# Patient Record
Sex: FEMALE | Race: WHITE | Employment: FULL TIME | URBAN - METROPOLITAN AREA
[De-identification: names, ages, dates, MRNs, and addresses within clinical notes are randomized per-mention and may not be internally consistent; named-entity substitution may affect disease eponyms.]

---

## 2021-10-15 ENCOUNTER — APPOINTMENT (OUTPATIENT)
Dept: CT IMAGING | Age: 21
DRG: 494 | End: 2021-10-15
Payer: COMMERCIAL

## 2021-10-15 ENCOUNTER — APPOINTMENT (OUTPATIENT)
Dept: GENERAL RADIOLOGY | Age: 21
DRG: 494 | End: 2021-10-15
Payer: COMMERCIAL

## 2021-10-15 ENCOUNTER — HOSPITAL ENCOUNTER (INPATIENT)
Age: 21
LOS: 2 days | Discharge: HOME | DRG: 494 | End: 2021-10-17
Attending: EMERGENCY MEDICINE | Admitting: SURGERY
Payer: COMMERCIAL

## 2021-10-15 DIAGNOSIS — S82.301A CLOSED FRACTURE OF DISTAL END OF RIGHT TIBIA, UNSPECIFIED FRACTURE MORPHOLOGY, INITIAL ENCOUNTER: Primary | ICD-10-CM

## 2021-10-15 DIAGNOSIS — T14.90XA TRAUMA: ICD-10-CM

## 2021-10-15 LAB
ABO/RH: NORMAL
ALLEN TEST: ABNORMAL
ANION GAP SERPL CALCULATED.3IONS-SCNC: 11 MMOL/L (ref 9–17)
ANTIBODY SCREEN: NEGATIVE
ARM BAND NUMBER: NORMAL
BLOOD BANK SPECIMEN: ABNORMAL
BUN BLDV-MCNC: 10 MG/DL (ref 6–20)
CARBOXYHEMOGLOBIN: 0.4 % (ref 0–5)
CHLORIDE BLD-SCNC: 109 MMOL/L (ref 98–107)
CO2: 19 MMOL/L (ref 20–31)
CREAT SERPL-MCNC: 0.5 MG/DL (ref 0.5–0.9)
ETHANOL PERCENT: <0.01 %
ETHANOL: <10 MG/DL
EXPIRATION DATE: NORMAL
FIO2: ABNORMAL
GFR AFRICAN AMERICAN: ABNORMAL ML/MIN
GFR NON-AFRICAN AMERICAN: ABNORMAL ML/MIN
GFR SERPL CREATININE-BSD FRML MDRD: ABNORMAL ML/MIN/{1.73_M2}
GFR SERPL CREATININE-BSD FRML MDRD: ABNORMAL ML/MIN/{1.73_M2}
GLUCOSE BLD-MCNC: 115 MG/DL (ref 70–99)
HCG QUALITATIVE: NEGATIVE
HCO3 VENOUS: 22.9 MMOL/L (ref 24–30)
HCT VFR BLD CALC: 36.5 % (ref 36.3–47.1)
HEMOGLOBIN: 11.7 G/DL (ref 11.9–15.1)
INR BLD: 1.1
MCH RBC QN AUTO: 31.8 PG (ref 25.2–33.5)
MCHC RBC AUTO-ENTMCNC: 32.1 G/DL (ref 28.4–34.8)
MCV RBC AUTO: 99.2 FL (ref 82.6–102.9)
METHEMOGLOBIN: ABNORMAL % (ref 0–1.5)
MODE: ABNORMAL
NEGATIVE BASE EXCESS, VEN: 3.3 MMOL/L (ref 0–2)
NOTIFICATION TIME: ABNORMAL
NOTIFICATION: ABNORMAL
NRBC AUTOMATED: 0 PER 100 WBC
O2 DEVICE/FLOW/%: ABNORMAL
O2 SAT, VEN: 61 % (ref 60–85)
OXYHEMOGLOBIN: ABNORMAL % (ref 95–98)
PARTIAL THROMBOPLASTIN TIME: 25.3 SEC (ref 20.5–30.5)
PATIENT TEMP: 37
PCO2, VEN, TEMP ADJ: ABNORMAL MMHG (ref 39–55)
PCO2, VEN: 48.5 (ref 39–55)
PDW BLD-RTO: 11.8 % (ref 11.8–14.4)
PEEP/CPAP: ABNORMAL
PH VENOUS: 7.29 (ref 7.32–7.42)
PH, VEN, TEMP ADJ: ABNORMAL (ref 7.32–7.42)
PLATELET # BLD: 203 K/UL (ref 138–453)
PMV BLD AUTO: 10 FL (ref 8.1–13.5)
PO2, VEN, TEMP ADJ: ABNORMAL MMHG (ref 30–50)
PO2, VEN: 37.3 (ref 30–50)
POSITIVE BASE EXCESS, VEN: ABNORMAL MMOL/L (ref 0–2)
POTASSIUM SERPL-SCNC: 3.9 MMOL/L (ref 3.7–5.3)
PROTHROMBIN TIME: 12.1 SEC (ref 9.1–12.3)
PSV: ABNORMAL
PT. POSITION: ABNORMAL
RBC # BLD: 3.68 M/UL (ref 3.95–5.11)
RESPIRATORY RATE: ABNORMAL
SAMPLE SITE: ABNORMAL
SARS-COV-2, RAPID: NOT DETECTED
SET RATE: ABNORMAL
SODIUM BLD-SCNC: 139 MMOL/L (ref 135–144)
SPECIMEN DESCRIPTION: NORMAL
TEXT FOR RESPIRATORY: ABNORMAL
TOTAL HB: ABNORMAL G/DL (ref 12–16)
TOTAL RATE: ABNORMAL
VITAMIN D 25-HYDROXY: 29.3 NG/ML (ref 30–100)
VT: ABNORMAL
WBC # BLD: 15.2 K/UL (ref 4.5–13.5)

## 2021-10-15 PROCEDURE — 99284 EMERGENCY DEPT VISIT MOD MDM: CPT

## 2021-10-15 PROCEDURE — 96374 THER/PROPH/DIAG INJ IV PUSH: CPT

## 2021-10-15 PROCEDURE — 6360000002 HC RX W HCPCS: Performed by: STUDENT IN AN ORGANIZED HEALTH CARE EDUCATION/TRAINING PROGRAM

## 2021-10-15 PROCEDURE — 82805 BLOOD GASES W/O2 SATURATION: CPT

## 2021-10-15 PROCEDURE — 82306 VITAMIN D 25 HYDROXY: CPT

## 2021-10-15 PROCEDURE — 84703 CHORIONIC GONADOTROPIN ASSAY: CPT

## 2021-10-15 PROCEDURE — 73700 CT LOWER EXTREMITY W/O DYE: CPT

## 2021-10-15 PROCEDURE — 73600 X-RAY EXAM OF ANKLE: CPT

## 2021-10-15 PROCEDURE — 85610 PROTHROMBIN TIME: CPT

## 2021-10-15 PROCEDURE — 73620 X-RAY EXAM OF FOOT: CPT

## 2021-10-15 PROCEDURE — G0480 DRUG TEST DEF 1-7 CLASSES: HCPCS

## 2021-10-15 PROCEDURE — 6370000000 HC RX 637 (ALT 250 FOR IP): Performed by: STUDENT IN AN ORGANIZED HEALTH CARE EDUCATION/TRAINING PROGRAM

## 2021-10-15 PROCEDURE — 73590 X-RAY EXAM OF LOWER LEG: CPT

## 2021-10-15 PROCEDURE — 85730 THROMBOPLASTIN TIME PARTIAL: CPT

## 2021-10-15 PROCEDURE — 72170 X-RAY EXAM OF PELVIS: CPT

## 2021-10-15 PROCEDURE — 73562 X-RAY EXAM OF KNEE 3: CPT

## 2021-10-15 PROCEDURE — 85027 COMPLETE CBC AUTOMATED: CPT

## 2021-10-15 PROCEDURE — 80051 ELECTROLYTE PANEL: CPT

## 2021-10-15 PROCEDURE — 82565 ASSAY OF CREATININE: CPT

## 2021-10-15 PROCEDURE — 86900 BLOOD TYPING SEROLOGIC ABO: CPT

## 2021-10-15 PROCEDURE — 71045 X-RAY EXAM CHEST 1 VIEW: CPT

## 2021-10-15 PROCEDURE — 82947 ASSAY GLUCOSE BLOOD QUANT: CPT

## 2021-10-15 PROCEDURE — 87635 SARS-COV-2 COVID-19 AMP PRB: CPT

## 2021-10-15 PROCEDURE — 86850 RBC ANTIBODY SCREEN: CPT

## 2021-10-15 PROCEDURE — 6810039001 HC L1 TRAUMA PRIORITY

## 2021-10-15 PROCEDURE — 1200000000 HC SEMI PRIVATE

## 2021-10-15 PROCEDURE — 2700000000 HC OXYGEN THERAPY PER DAY

## 2021-10-15 PROCEDURE — 73552 X-RAY EXAM OF FEMUR 2/>: CPT

## 2021-10-15 PROCEDURE — 86901 BLOOD TYPING SEROLOGIC RH(D): CPT

## 2021-10-15 PROCEDURE — 84520 ASSAY OF UREA NITROGEN: CPT

## 2021-10-15 PROCEDURE — 2580000003 HC RX 258: Performed by: STUDENT IN AN ORGANIZED HEALTH CARE EDUCATION/TRAINING PROGRAM

## 2021-10-15 RX ORDER — ONDANSETRON 2 MG/ML
4 INJECTION INTRAMUSCULAR; INTRAVENOUS EVERY 6 HOURS PRN
Status: DISCONTINUED | OUTPATIENT
Start: 2021-10-15 | End: 2021-10-17 | Stop reason: HOSPADM

## 2021-10-15 RX ORDER — ONDANSETRON 2 MG/ML
INJECTION INTRAMUSCULAR; INTRAVENOUS
Status: DISPENSED
Start: 2021-10-15 | End: 2021-10-16

## 2021-10-15 RX ORDER — PROPOFOL 10 MG/ML
INJECTION, EMULSION INTRAVENOUS
Status: DISPENSED
Start: 2021-10-15 | End: 2021-10-16

## 2021-10-15 RX ORDER — SODIUM CHLORIDE 9 MG/ML
25 INJECTION, SOLUTION INTRAVENOUS PRN
Status: DISCONTINUED | OUTPATIENT
Start: 2021-10-15 | End: 2021-10-17 | Stop reason: HOSPADM

## 2021-10-15 RX ORDER — POLYETHYLENE GLYCOL 3350 17 G/17G
17 POWDER, FOR SOLUTION ORAL DAILY
Status: DISCONTINUED | OUTPATIENT
Start: 2021-10-16 | End: 2021-10-17 | Stop reason: HOSPADM

## 2021-10-15 RX ORDER — OXYCODONE HYDROCHLORIDE 5 MG/1
5 TABLET ORAL EVERY 4 HOURS PRN
Status: DISCONTINUED | OUTPATIENT
Start: 2021-10-15 | End: 2021-10-17

## 2021-10-15 RX ORDER — PROPOFOL 10 MG/ML
5-50 INJECTION, EMULSION INTRAVENOUS
Status: DISCONTINUED | OUTPATIENT
Start: 2021-10-15 | End: 2021-10-16

## 2021-10-15 RX ORDER — METHOCARBAMOL 500 MG/1
750 TABLET, FILM COATED ORAL 4 TIMES DAILY
Status: DISCONTINUED | OUTPATIENT
Start: 2021-10-15 | End: 2021-10-17 | Stop reason: HOSPADM

## 2021-10-15 RX ORDER — SODIUM CHLORIDE 0.9 % (FLUSH) 0.9 %
5-40 SYRINGE (ML) INJECTION PRN
Status: DISCONTINUED | OUTPATIENT
Start: 2021-10-15 | End: 2021-10-17 | Stop reason: HOSPADM

## 2021-10-15 RX ORDER — FENTANYL CITRATE 50 UG/ML
50 INJECTION, SOLUTION INTRAMUSCULAR; INTRAVENOUS
Status: DISCONTINUED | OUTPATIENT
Start: 2021-10-15 | End: 2021-10-17 | Stop reason: HOSPADM

## 2021-10-15 RX ORDER — GABAPENTIN 300 MG/1
300 CAPSULE ORAL 3 TIMES DAILY
Status: DISCONTINUED | OUTPATIENT
Start: 2021-10-15 | End: 2021-10-17 | Stop reason: HOSPADM

## 2021-10-15 RX ORDER — ACETAMINOPHEN 500 MG
1000 TABLET ORAL EVERY 8 HOURS SCHEDULED
Status: DISCONTINUED | OUTPATIENT
Start: 2021-10-15 | End: 2021-10-17 | Stop reason: HOSPADM

## 2021-10-15 RX ORDER — FENTANYL CITRATE 50 UG/ML
50 INJECTION, SOLUTION INTRAMUSCULAR; INTRAVENOUS ONCE
Status: COMPLETED | OUTPATIENT
Start: 2021-10-15 | End: 2021-10-15

## 2021-10-15 RX ORDER — PROPOFOL 10 MG/ML
1000 INJECTION, EMULSION INTRAVENOUS
Status: DISCONTINUED | OUTPATIENT
Start: 2021-10-15 | End: 2021-10-15

## 2021-10-15 RX ORDER — ONDANSETRON 2 MG/ML
4 INJECTION INTRAMUSCULAR; INTRAVENOUS ONCE
Status: COMPLETED | OUTPATIENT
Start: 2021-10-15 | End: 2021-10-15

## 2021-10-15 RX ORDER — BISACODYL 10 MG
10 SUPPOSITORY, RECTAL RECTAL DAILY PRN
Status: DISCONTINUED | OUTPATIENT
Start: 2021-10-15 | End: 2021-10-17 | Stop reason: HOSPADM

## 2021-10-15 RX ORDER — SODIUM CHLORIDE 0.9 % (FLUSH) 0.9 %
5-40 SYRINGE (ML) INJECTION EVERY 12 HOURS SCHEDULED
Status: DISCONTINUED | OUTPATIENT
Start: 2021-10-15 | End: 2021-10-17 | Stop reason: HOSPADM

## 2021-10-15 RX ORDER — SODIUM CHLORIDE, SODIUM LACTATE, POTASSIUM CHLORIDE, CALCIUM CHLORIDE 600; 310; 30; 20 MG/100ML; MG/100ML; MG/100ML; MG/100ML
INJECTION, SOLUTION INTRAVENOUS CONTINUOUS
Status: DISCONTINUED | OUTPATIENT
Start: 2021-10-15 | End: 2021-10-16

## 2021-10-15 RX ORDER — PROPOFOL 10 MG/ML
INJECTION, EMULSION INTRAVENOUS DAILY PRN
Status: COMPLETED | OUTPATIENT
Start: 2021-10-15 | End: 2021-10-15

## 2021-10-15 RX ADMIN — ONDANSETRON 4 MG: 2 INJECTION INTRAMUSCULAR; INTRAVENOUS at 23:31

## 2021-10-15 RX ADMIN — PROPOFOL 10 MG: 10 INJECTION, EMULSION INTRAVENOUS at 21:19

## 2021-10-15 RX ADMIN — SODIUM CHLORIDE, PRESERVATIVE FREE 10 ML: 5 INJECTION INTRAVENOUS at 23:33

## 2021-10-15 RX ADMIN — ACETAMINOPHEN 1000 MG: 500 TABLET ORAL at 23:37

## 2021-10-15 RX ADMIN — PROPOFOL 10 MG: 10 INJECTION, EMULSION INTRAVENOUS at 21:23

## 2021-10-15 RX ADMIN — PROPOFOL 10 MG: 10 INJECTION, EMULSION INTRAVENOUS at 21:17

## 2021-10-15 RX ADMIN — PROPOFOL 10 MG: 10 INJECTION, EMULSION INTRAVENOUS at 21:24

## 2021-10-15 RX ADMIN — PROPOFOL 10 MG: 10 INJECTION, EMULSION INTRAVENOUS at 21:25

## 2021-10-15 RX ADMIN — PROPOFOL 10 MG: 10 INJECTION, EMULSION INTRAVENOUS at 21:20

## 2021-10-15 RX ADMIN — METHOCARBAMOL TABLETS 750 MG: 500 TABLET, COATED ORAL at 23:37

## 2021-10-15 RX ADMIN — FENTANYL CITRATE 50 MCG: 50 INJECTION INTRAMUSCULAR; INTRAVENOUS at 22:34

## 2021-10-15 RX ADMIN — PROPOFOL 10 MG: 10 INJECTION, EMULSION INTRAVENOUS at 21:18

## 2021-10-15 RX ADMIN — SODIUM CHLORIDE, POTASSIUM CHLORIDE, SODIUM LACTATE AND CALCIUM CHLORIDE: 600; 310; 30; 20 INJECTION, SOLUTION INTRAVENOUS at 23:44

## 2021-10-15 RX ADMIN — GABAPENTIN 300 MG: 300 CAPSULE ORAL at 23:37

## 2021-10-15 RX ADMIN — PROPOFOL 20 MG: 10 INJECTION, EMULSION INTRAVENOUS at 21:21

## 2021-10-15 RX ADMIN — PROPOFOL 10 MG: 10 INJECTION, EMULSION INTRAVENOUS at 21:22

## 2021-10-15 ASSESSMENT — ENCOUNTER SYMPTOMS
CHEST TIGHTNESS: 0
WHEEZING: 0
ABDOMINAL PAIN: 0
BACK PAIN: 0
VOMITING: 0
SHORTNESS OF BREATH: 0
NAUSEA: 0
CONSTIPATION: 0
DIARRHEA: 0
COLOR CHANGE: 0
COUGH: 0

## 2021-10-15 ASSESSMENT — PAIN SCALES - GENERAL
PAINLEVEL_OUTOF10: 10
PAINLEVEL_OUTOF10: 8

## 2021-10-15 NOTE — ED PROVIDER NOTES
Food in the Last Year:    Transportation Needs:     Lack of Transportation (Medical):  Lack of Transportation (Non-Medical):    Physical Activity:     Days of Exercise per Week:     Minutes of Exercise per Session:    Stress:     Feeling of Stress :    Social Connections:     Frequency of Communication with Friends and Family:     Frequency of Social Gatherings with Friends and Family:     Attends Jewish Services:     Active Member of Clubs or Organizations:     Attends Club or Organization Meetings:     Marital Status:    Intimate Partner Violence:     Fear of Current or Ex-Partner:     Emotionally Abused:     Physically Abused:     Sexually Abused:        No family history on file. Allergies:  Patient has no allergy information on record. Home Medications:  Prior to Admission medications    Not on File       REVIEW OF SYSTEMS    (2-9 systems for level 4, 10 or more for level 5)      Review of Systems   Constitutional: Negative for chills, fatigue and fever. Eyes: Negative for visual disturbance. Respiratory: Negative for cough, chest tightness, shortness of breath and wheezing. Cardiovascular: Negative for chest pain, palpitations and leg swelling. Gastrointestinal: Negative for abdominal pain, constipation, diarrhea, nausea and vomiting. Genitourinary: Negative for difficulty urinating, dysuria and urgency. Musculoskeletal: Positive for arthralgias (Right ankle). Negative for back pain, neck pain and neck stiffness. Skin: Negative for color change, pallor and rash. Neurological: Negative for dizziness, weakness, light-headedness and headaches. PHYSICAL EXAM   (up to 7 for level 4, 8 or more for level 5)      INITIAL VITALS:   BP 95/72   Pulse 104   Resp 15   Ht 5' 2\" (1.575 m)   Wt 95 lb (43.1 kg)   SpO2 100%   BMI 17.38 kg/m²     Physical Exam  Vitals and nursing note reviewed. Constitutional:       General: She is not in acute distress.      Appearance: She is well-developed. She is not diaphoretic. HENT:      Head: Normocephalic and atraumatic. Eyes:      General: No scleral icterus. Conjunctiva/sclera: Conjunctivae normal.      Pupils: Pupils are equal, round, and reactive to light. Neck:      Vascular: No JVD. Trachea: No tracheal deviation. Cardiovascular:      Rate and Rhythm: Normal rate and regular rhythm. Heart sounds: Normal heart sounds. No murmur heard. No friction rub. Pulmonary:      Effort: Pulmonary effort is normal. No respiratory distress. Breath sounds: Normal breath sounds. No wheezing. Chest:      Chest wall: No tenderness. Abdominal:      General: Bowel sounds are normal. There is no distension. Palpations: Abdomen is soft. Tenderness: There is no abdominal tenderness. There is no guarding. Musculoskeletal:         General: Tenderness (Right ankle) present. Cervical back: Normal range of motion and neck supple. Left lower leg: Edema (in the area of fracture) present. Skin:     General: Skin is warm and dry. Capillary Refill: Capillary refill takes less than 2 seconds. Coloration: Skin is not pale. Findings: No erythema. Neurological:      Mental Status: She is alert and oriented to person, place, and time. Cranial Nerves: No cranial nerve deficit. Sensory: No sensory deficit.    Psychiatric:         Behavior: Behavior normal.         DIFFERENTIAL  DIAGNOSIS     PLAN (LABS / IMAGING / EKG):  Orders Placed This Encounter   Procedures    COVID-19, Rapid    COVID-19, Rapid    XR CHEST PORTABLE    XR PELVIS (1-2 VIEWS)    XR FEMUR RIGHT (MIN 2 VIEWS)    XR KNEE RIGHT (3 VIEWS)    XR TIBIA FIBULA RIGHT (2 VIEWS)    XR ANKLE RIGHT (2 VIEWS)    XR FOOT RIGHT (2 VIEWS)    XR TIBIA FIBULA RIGHT (2 VIEWS)    XR TIBIA FIBULA RIGHT (2 VIEWS)    XR TIBIA FIBULA RIGHT (2 VIEWS)    CT TIBIA FIBULA RIGHT WO CONTRAST    CT FOOT RIGHT WO CONTRAST    Trauma Panel  Vitamin D 25 Hydroxy    Diet NPO    Non weight bearing    Inpatient consult to Orthopedic Surgery    End Tidal CO2 Continuous    Nasal Cannula Oxygen    Type and Screen    PATIENT STATUS (FROM ED OR OR/PROCEDURAL) Inpatient       MEDICATIONS ORDERED:  Orders Placed This Encounter   Medications    DISCONTD: iopamidol (ISOVUE-370) 76 % injection 130 mL    ondansetron (ZOFRAN) 4 MG/2ML injection     Ezio Barba: cabinet override    DISCONTD: propofol injection 1,000 mcg    propofol 200 MG/20ML injection     EMMANUEL PASCUAL: cabinet override    propofol injection    ondansetron (ZOFRAN) injection 4 mg    ondansetron (ZOFRAN) 4 MG/2ML injection     EMMANUEL PASCUAL: cabinet override    propofol injection    ceFAZolin (ANCEF) 2000 mg in dextrose 5 % 50 mL IVPB     Order Specific Question:   Antimicrobial Indications     Answer:   Surgical Prophylaxis    fentaNYL (SUBLIMAZE) injection 50 mcg       DDX: Ankle fracture, deformity    MDM/IMPRESSION: This is a 45-year-old female sent from outside hospital for concern for unable to obtain pulses with right tib-fib fracture with obvious deformity. No open wound on that ankle. There is a small 1.5 cm laceration on the medial malleolus of the left ankle. Trauma team met patient in the trauma bay upon arrival to trauma priority given concern for lack of pulse, however pulse was able to be obtained. With Doppler that was palpable once it was located. Anticipate Ortho consult and probable trauma admission for OR with ortho tomorrow. DIAGNOSTIC RESULTS / EMERGENCY DEPARTMENT COURSE / MDM   LAB RESULTS:  Results for orders placed or performed during the hospital encounter of 10/15/21   COVID-19, Rapid    Specimen: Nasopharyngeal Swab   Result Value Ref Range    Specimen Description . NASOPHARYNGEAL SWAB     SARS-CoV-2, Rapid Not Detected Not Detected   Trauma Panel   Result Value Ref Range    Ethanol <10 <10 mg/dL    Ethanol percent <0.010 <0.010 %    Blood Bank Specimen BILL FOR SERVICES PERFORMED     BUN 10 6 - 20 mg/dL    WBC 15.2 (H) 4.5 - 13.5 k/uL    RBC 3.68 (L) 3.95 - 5.11 m/uL    Hemoglobin 11.7 (L) 11.9 - 15.1 g/dL    Hematocrit 36.5 36.3 - 47.1 %    MCV 99.2 82.6 - 102.9 fL    MCH 31.8 25.2 - 33.5 pg    MCHC 32.1 28.4 - 34.8 g/dL    RDW 11.8 11.8 - 14.4 %    Platelets 376 663 - 989 k/uL    MPV 10.0 8.1 - 13.5 fL    NRBC Automated 0.0 0.0 per 100 WBC    CREATININE 0.50 0.50 - 0.90 mg/dL    GFR Non- NOT REPORTED >60 mL/min    GFR  NOT REPORTED >60 mL/min    GFR Comment NOT REPORTED     GFR Staging NOT REPORTED     Glucose 115 (H) 70 - 99 mg/dL    hCG Qual NEGATIVE NEGATIVE    Sodium 139 135 - 144 mmol/L    Potassium 3.9 3.7 - 5.3 mmol/L    Chloride 109 (H) 98 - 107 mmol/L    CO2 19 (L) 20 - 31 mmol/L    Anion Gap 11 9 - 17 mmol/L    Protime 12.1 9.1 - 12.3 sec    INR 1.1     PTT 25.3 20.5 - 30.5 sec    pH, Edgardo 7.295 (L) 7.320 - 7.420    pCO2, Edgardo 48.5 39 - 55    pO2, Edgardo 37.3 30 - 50    HCO3, Venous 22.9 (L) 24 - 30 mmol/L    Positive Base Excess, Edgardo NOT REPORTED 0.0 - 2.0 mmol/L    Negative Base Excess, Edgardo 3.3 (H) 0.0 - 2.0 mmol/L    O2 Sat, Edgardo 61.0 60.0 - 85.0 %    Total Hb NOT REPORTED 12.0 - 16.0 g/dl    Oxyhemoglobin NOT REPORTED 95.0 - 98.0 %    Carboxyhemoglobin 0.4 0 - 5 %    Methemoglobin NOT REPORTED 0.0 - 1.5 %    Pt Temp 37.0     pH, Edgardo, Temp Adj NOT REPORTED 7.320 - 7.420    pCO2, Edgardo, Temp Adj NOT REPORTED 39 - 55 mmHg    pO2, Edgardo, Temp Adj NOT REPORTED 30 - 50 mmHg    O2 Device/Flow/% NOT REPORTED     Respiratory Rate NOT REPORTED     Brandon Test NOT REPORTED     Sample Site NOT REPORTED     Pt.  Position NOT REPORTED     Mode NOT REPORTED     Set Rate NOT REPORTED     Total Rate NOT REPORTED     VT NOT REPORTED     FIO2 RA     Peep/Cpap NOT REPORTED     PSV NOT REPORTED     Text for Respiratory NOT REPORTED     NOTIFICATION NOT REPORTED     NOTIFICATION TIME NOT REPORTED    TYPE AND SCREEN   Result Value Ref Range tib-fib fracture, displaced  POSTOPERATIVE DIAGNOSIS:  Same  PROCEDURE PERFORMED:   Procedural Sedation  PERFORMING PHYSICIAN: Radha Hussein DO. The attending physician was present and supervising this procedure. DISCUSSION:  Bu Adult Lissette Molina is a 22y.o.-year-old female who requires procedural sedation for R distal tib-fib fracture. The history and physical examination were reviewed and confirmed. CONSENT: I have discussed with the patient and/or the patient representative the indication, alternatives, and the possible risks and/or complications of the planned procedure and the anesthesia methods. The patient and/or patient representative appear to understand and agree to proceed. PRE-SEDATION DOCUMENTATION AND EXAM: I have personally completed a history, physical exam & review of systems for this patient (see notes). Vital signs have been reviewed (see flow sheet for vitals). AIRWAY ASSESSMENT: normal    PRIOR HISTORY OF ANESTHESIA COMPLICATIONS: emergence reaction with ketamine    ASA CLASSIFICATION: Class 1 - A normal healthy patient    SEDATION/ANESTHESIA PLAN: intravenous sedation    MEDICATIONS USED: propofol intravenously    MONITORING AND SAFETY: The patient was placed on a cardiac monitor and vital signs, pulse oximetry and level of consciousness were continuously evaluated throughout the procedure. The patient was closely monitored until recovery from the medications was complete and the patient had returned to baseline status. Respiratory therapy was on standby at all times during the procedure. Total duration of sedation was 58 minutes from sedation start to when patient was awake.     (The following sections must be completed)  POST-SEDATION VITAL SIGNS: Vital signs were reviewed and were stable after the procedure (see flow sheet for vitals)            POST-SEDATION EXAM: Lungs: clear to auscultation bilaterally and Cardiovascular: normal    COMPLICATIONS: none     Radha Osborn DO [place]  10:47 PM, 10/15/21      CONSULTS:  IP CONSULT TO ORTHOPEDIC SURGERY    CRITICAL CARE:      FINAL IMPRESSION      1. Closed fracture of distal end of right tibia, unspecified fracture morphology, initial encounter    2. Trauma          DISPOSITION / PLAN     DISPOSITION Admitted 10/15/2021 07:50:05 PM      PATIENT REFERRED TO:  No follow-up provider specified.     DISCHARGE MEDICATIONS:  New Prescriptions    No medications on file       Noam Shore DO  Emergency Medicine Resident    (Please note that portions of thisnote were completed with a voice recognition program.  Efforts were made to edit the dictations but occasionally words are mis-transcribed.)     Noam Shore DO  10/15/21 5553

## 2021-10-15 NOTE — FLOWSHEET NOTE
[facility] CARE DEPARTMENT - [address]     Emergency/Trauma Note    PATIENT NAME: Britany Adult Erma Yu    Shift date: 10/15/21  Shift day: Friday   Shift # 2    Room # TRAUMA A/TRAUMAA   Name: Britany Yu  Age: 81 y.o. Gender: female          Scientologist: No Evangelical on file   Place of Episcopalian:     Trauma/Incident type: Adult Trauma Priority  Admit Date & Time: 10/15/2021  7:03 PM  TRAUMA NAME: Huyen Nguyen    ADVANCE DIRECTIVES IN CHART? No    NAME OF DECISION MAKER: N/A    RELATIONSHIP OF DECISION MAKER TO PATIENT: N/A    PATIENT/EVENT DESCRIPTION:   Adult Leticia Professor [address]  2000 is a 81 y.o. female who arrived via [facility]. Patient was a transfer from [facility] in [facility]. as a  ED TRAUMA PRIORITY. Per EMS, patient was involved in industrial accident. Pt to be admitted to TRAUMA A/TRAUMAA. SPIRITUAL ASSESSMENT/INTERVENTION:  Donaldo Herrera was present when patient arrived and gathered name and D. O.B information from EMS. Patient name Ja Yu and YOB: 2000. Per EMS, family is on the way and lives in [facility] [place] [facility].  spoke with patient who appeared very groggy and patient was asking for family, Yoseph Kearns, and Leopold Fritz. These would be parents, sibling, and boyfriend, per patient. Family arrived and  was a ministry of presence.  spoke with medical team who had 3 family members come back to see her before sedation. Family expressed gratitude for connecting them with patient. PATIENT BELONGINGS:  With patient    ANY BELONGINGS OF SIGNIFICANT VALUE NOTED:  N/A    REGISTRATION STAFF NOTIFIED? Yes      WHAT IS YOUR SPIRITUAL CARE PLAN FOR THIS PATIENT?:   Chaplains will remain available to offer spiritual and emotional support as needed.       Electronically signed by Katrine Saint Resident, on 10/15/2021 at 7:41 PM.  [address] [place]  [phone]       10/15/21 1940   Encounter Summary   Services provided to: Patient; Family   Referral/Consult From: Multi-disciplinary team   Support System Parent   Continue Visiting   (10/15/21)   Complexity of Encounter High   Length of Encounter 1 hour   Spiritual Assessment Completed Yes   Crisis   Type Trauma   Assessment Sleeping; Anxious;Passive   Intervention Sustaining presence/ Ministry of presence; Active listening;Prayer   Outcome Receptive

## 2021-10-15 NOTE — H&P
TRAUMA HISTORY AND PHYSICAL EXAMINATION    PATIENT NAME:  Adult Xxmumbai  YOB: 1879  MEDICAL RECORD NO. [id number]   DATE: 10/15/2021  PRIMARY CARE PHYSICIAN: No primary care provider on file. PATIENT EVALUATED AT THE REQUEST OF :     ACTIVATION   []Trauma Alert     [x] Trauma Priority     []Trauma Consult. IMPRESSION:     Work injury, RLE trauma    MEDICAL DECISION MAKING AND PLAN:       Forklift dropped on right foot, Txr for open tib fib fx    Pan XR scan of RLE  Trauma panel  Consults:   -Ortho  Pain: MMT  NPO  IVF  Abx:  Tetanus  Covid: pending  Admit to: med surg    CONSULT SERVICES    [] Neurosurgery     [x] Orthopedic Surgery    [] Cardiothoracic     [] Facial Trauma    [] Plastic Surgery (Burn)    [] Pediatric Surgery     [] Internal Medicine    [] Pulmonary Medicine    [] Other:        HISTORY:     Chief Complaint:  \"My leg hurts\"    INJURY SUMMARY  R tib/fib open fx pending further images      GENERAL DATA  Age 81 y.o.  female   Patient information was obtained from patient and EMS personnel. History/Exam limitations: none. Patient presented to the Emergency Department by ambulance where the patient received IV and splinted right lower extremity prior to arrival.  Injury Date: 10/15/2021   Approximate Injury Time: unknown, today        Transport mode:   [x]Ambulance      [] Helicopter     []Car       [] Other  Referring Hospital: [facility],  Work    MECHANISM OF INJURY    [x] Other ____________forklift fell on leg___________________________________        HISTORY:      Adult Meryl Galicia is a 81 y.o. female that presented to the Emergency Department following an accident at work when a forklift fell on her RLE. Pt transferred from [facility] for further ortho and vascular surgery care due to open fx and reported non-palpable RLE pulse. Pt arrives G[facility] 15 with RLE in splint. Complains only of RLE pain. Denies CP, SOB, F/C.  Has nausea with dry heaves in T-bay. VSS. Loss of Consciousness [x]No   []Yes Duration(min)          [] Unknown     Total Fluids Given Prior To Arrival unk mL    MEDICATIONS:     Inhaler (unspecified), melatonin      ALLERGIES:     Codeine: anaphylaxis    PAST MEDICAL HISTORY:   Asthma    FAMILY HISTORY   Unknown    SOCIAL HISTORY   Vapes x1 year  Denies ETOH and other drug use      Review of Systems:    []   Information not available due to exam limitations documented above    Review of Systems   As reviewed in HPI. All other systems were reviewed and were negative. PHYSICAL EXAMINATION:     GLASCOW COMA SCALE  NEUROMUSCULAR BLOCKADE PRIOR TO ARRIVAL     [x]No        []Yes      Variable  Score   Variable  Score  Eye opening [x]Spontaneous 4 Verbal  [x]Oriented  5     []To voice  3   []Confused  4    []To pain  2   []Inapp words  3    []None  1   []Incomp words 2       []None  1   Motor   [x]Obeys  6    []Localizes pain 5    []Withdraws(pain) 4    []Flexion(pain) 3  []Extension(pain) 2    []None  1     GCS Total = 15    PHYSICAL EXAMINATION    VITAL SIGNS: There were no vitals filed for this visit. Physical Exam     GENERAL:  awake, cooperative, NAD  HEENT:  NCAT  CV:  RRR, well perfused  LUNGS:  CTAB, unlabored breathing  ABDOMEN:  soft, non-distended, without tenderness to palpation  EXTREMITIES: Gross deformity RLE with open tib fib fx, radial/DP/Fem pulses +2 b/l. L medial malleolus with 1 cm lac. B/L knee with ecchymosis. MSK:  No tenderness to palpation throughout, without gross deformity. Ecchymosis to left breast.  NEURO:  A&Ox3, CN 2-12 grossly intact, sensation to light touch grossly intact BUE & BLE, motor strength 5/5  strength, wiggles toes, repositions self in bed independently   SKIN: Warm and dry      FOCUSED ABDOMINAL SONOGRAM FOR TRAUMA (FAST): A good  quality examination was performed by Dr. Wendy Albarran and representative images were obtained.     [x] No free fluid in the abdomen   [] Free fluid in RUQ   [] Free fluid in LUQ  [] Free fluid in Pelvis  [] Pericardial fluid  [] Other:        RADIOLOGY  CT CHEST ABDOMEN PELVIS W CONTRAST    (Results Pending)   CT HEAD WO CONTRAST    (Results Pending)   CT CERVICAL SPINE WO CONTRAST    (Results Pending)   CT THORACIC SPINE TRAUMA RECONSTRUCTION    (Results Pending)   CT LUMBAR SPINE TRAUMA RECONSTRUCTION    (Results Pending)   XR CHEST PORTABLE    (Results Pending)         LABS    Labs Reviewed   [address] [place] [address],   10/15/21, 7:16 PM

## 2021-10-15 NOTE — ED PROVIDER NOTES
Aida Vu  ED     Emergency Department     Faculty Attestation    I performed a history and physical examination of the patient and discussed management with the resident. I reviewed the residents note and agree with the documented findings and plan of care. Any areas of disagreement are noted on the chart. I was personally present for the key portions of any procedures. I have documented in the chart those procedures where I was not present during the key portions. I have reviewed the emergency nurses triage note. I agree with the chief complaint, past medical history, past surgical history, allergies, medications, social and family history as documented unless otherwise noted below. For Physician Assistant/ Nurse Practitioner cases/documentation I have personally evaluated this patient and have completed at least one if not all key elements of the E/M (history, physical exam, and MDM). Additional findings are as noted. Patient transferred here from outlying facility as a trauma priority. Patient was involved in a forklift accident at work earlier today at around 2 PM.  Patient was found to have distal tib-fib fracture and pulses were not palpable at outlying facility so was transferred here for orthopedic and vascular surgery consultations. On arrival here, patient is alert and oriented and answering questions appropriately. Airway is intact and breath sounds are equal bilaterally. Will await trauma surgery and orthopedic surgery recommendations.       Abdirahman Byrne MD  Attending Emergency  Physician              Luis Stock MD  10/15/21 4717

## 2021-10-16 ENCOUNTER — APPOINTMENT (OUTPATIENT)
Dept: GENERAL RADIOLOGY | Age: 21
DRG: 494 | End: 2021-10-16
Payer: COMMERCIAL

## 2021-10-16 ENCOUNTER — ANESTHESIA (OUTPATIENT)
Dept: OPERATING ROOM | Age: 21
DRG: 494 | End: 2021-10-16
Payer: COMMERCIAL

## 2021-10-16 ENCOUNTER — ANESTHESIA EVENT (OUTPATIENT)
Dept: OPERATING ROOM | Age: 21
DRG: 494 | End: 2021-10-16
Payer: COMMERCIAL

## 2021-10-16 VITALS
TEMPERATURE: 98.1 F | OXYGEN SATURATION: 100 % | DIASTOLIC BLOOD PRESSURE: 91 MMHG | SYSTOLIC BLOOD PRESSURE: 117 MMHG | RESPIRATION RATE: 13 BRPM

## 2021-10-16 LAB
HCT VFR BLD CALC: 34.9 % (ref 36.3–47.1)
HEMOGLOBIN: 11.3 G/DL (ref 11.9–15.1)

## 2021-10-16 PROCEDURE — 36415 COLL VENOUS BLD VENIPUNCTURE: CPT

## 2021-10-16 PROCEDURE — 2580000003 HC RX 258: Performed by: ORTHOPAEDIC SURGERY

## 2021-10-16 PROCEDURE — 6370000000 HC RX 637 (ALT 250 FOR IP): Performed by: ANESTHESIOLOGY

## 2021-10-16 PROCEDURE — 6360000002 HC RX W HCPCS: Performed by: NURSE ANESTHETIST, CERTIFIED REGISTERED

## 2021-10-16 PROCEDURE — 3209999900 FLUORO FOR SURGICAL PROCEDURES

## 2021-10-16 PROCEDURE — 73590 X-RAY EXAM OF LOWER LEG: CPT

## 2021-10-16 PROCEDURE — 6360000002 HC RX W HCPCS: Performed by: ORTHOPAEDIC SURGERY

## 2021-10-16 PROCEDURE — 2720000010 HC SURG SUPPLY STERILE: Performed by: ORTHOPAEDIC SURGERY

## 2021-10-16 PROCEDURE — C1713 ANCHOR/SCREW BN/BN,TIS/BN: HCPCS | Performed by: ORTHOPAEDIC SURGERY

## 2021-10-16 PROCEDURE — 27792 TREATMENT OF ANKLE FRACTURE: CPT | Performed by: ORTHOPAEDIC SURGERY

## 2021-10-16 PROCEDURE — 27759 TREATMENT OF TIBIA FRACTURE: CPT | Performed by: ORTHOPAEDIC SURGERY

## 2021-10-16 PROCEDURE — 2580000003 HC RX 258: Performed by: NURSE ANESTHETIST, CERTIFIED REGISTERED

## 2021-10-16 PROCEDURE — 7100000001 HC PACU RECOVERY - ADDTL 15 MIN: Performed by: ORTHOPAEDIC SURGERY

## 2021-10-16 PROCEDURE — 85018 HEMOGLOBIN: CPT

## 2021-10-16 PROCEDURE — 2500000003 HC RX 250 WO HCPCS: Performed by: NURSE ANESTHETIST, CERTIFIED REGISTERED

## 2021-10-16 PROCEDURE — 85014 HEMATOCRIT: CPT

## 2021-10-16 PROCEDURE — 3600000014 HC SURGERY LEVEL 4 ADDTL 15MIN: Performed by: ORTHOPAEDIC SURGERY

## 2021-10-16 PROCEDURE — 2709999900 HC NON-CHARGEABLE SUPPLY: Performed by: ORTHOPAEDIC SURGERY

## 2021-10-16 PROCEDURE — 7100000000 HC PACU RECOVERY - FIRST 15 MIN: Performed by: ORTHOPAEDIC SURGERY

## 2021-10-16 PROCEDURE — 3700000001 HC ADD 15 MINUTES (ANESTHESIA): Performed by: ORTHOPAEDIC SURGERY

## 2021-10-16 PROCEDURE — 0QSJ04Z REPOSITION RIGHT FIBULA WITH INTERNAL FIXATION DEVICE, OPEN APPROACH: ICD-10-PCS | Performed by: ORTHOPAEDIC SURGERY

## 2021-10-16 PROCEDURE — 3600000004 HC SURGERY LEVEL 4 BASE: Performed by: ORTHOPAEDIC SURGERY

## 2021-10-16 PROCEDURE — 6360000002 HC RX W HCPCS: Performed by: ANESTHESIOLOGY

## 2021-10-16 PROCEDURE — 1200000000 HC SEMI PRIVATE

## 2021-10-16 PROCEDURE — 6370000000 HC RX 637 (ALT 250 FOR IP): Performed by: ORTHOPAEDIC SURGERY

## 2021-10-16 PROCEDURE — 0QSG06Z REPOSITION RIGHT TIBIA WITH INTRAMEDULLARY INTERNAL FIXATION DEVICE, OPEN APPROACH: ICD-10-PCS | Performed by: ORTHOPAEDIC SURGERY

## 2021-10-16 PROCEDURE — 6370000000 HC RX 637 (ALT 250 FOR IP): Performed by: STUDENT IN AN ORGANIZED HEALTH CARE EDUCATION/TRAINING PROGRAM

## 2021-10-16 PROCEDURE — C1769 GUIDE WIRE: HCPCS | Performed by: ORTHOPAEDIC SURGERY

## 2021-10-16 PROCEDURE — 99253 IP/OBS CNSLTJ NEW/EST LOW 45: CPT | Performed by: ORTHOPAEDIC SURGERY

## 2021-10-16 PROCEDURE — 3700000000 HC ANESTHESIA ATTENDED CARE: Performed by: ORTHOPAEDIC SURGERY

## 2021-10-16 DEVICE — PLATE BNE L67MM 7 H BILAT S STL LOK COMPR LO PROF FOR 2.4MM: Type: IMPLANTABLE DEVICE | Site: TIBIA | Status: FUNCTIONAL

## 2021-10-16 DEVICE — SCREW BNE L14MM DIA2.7MM CORT S STL ST T8 STARDRV RECESS: Type: IMPLANTABLE DEVICE | Site: TIBIA | Status: FUNCTIONAL

## 2021-10-16 DEVICE — SCREW BNE L12MM DIA2.7MM CORT S STL ST T8 STARDRV RECESS: Type: IMPLANTABLE DEVICE | Site: TIBIA | Status: FUNCTIONAL

## 2021-10-16 RX ORDER — SCOLOPAMINE TRANSDERMAL SYSTEM 1 MG/1
1 PATCH, EXTENDED RELEASE TRANSDERMAL ONCE
Status: DISCONTINUED | OUTPATIENT
Start: 2021-10-16 | End: 2021-10-16

## 2021-10-16 RX ORDER — GLYCOPYRROLATE 1 MG/5 ML
SYRINGE (ML) INTRAVENOUS PRN
Status: DISCONTINUED | OUTPATIENT
Start: 2021-10-16 | End: 2021-10-16 | Stop reason: SDUPTHER

## 2021-10-16 RX ORDER — SODIUM CHLORIDE, SODIUM LACTATE, POTASSIUM CHLORIDE, CALCIUM CHLORIDE 600; 310; 30; 20 MG/100ML; MG/100ML; MG/100ML; MG/100ML
INJECTION, SOLUTION INTRAVENOUS CONTINUOUS PRN
Status: DISCONTINUED | OUTPATIENT
Start: 2021-10-16 | End: 2021-10-16 | Stop reason: SDUPTHER

## 2021-10-16 RX ORDER — 0.9 % SODIUM CHLORIDE 0.9 %
500 INTRAVENOUS SOLUTION INTRAVENOUS
Status: DISCONTINUED | OUTPATIENT
Start: 2021-10-16 | End: 2021-10-16 | Stop reason: HOSPADM

## 2021-10-16 RX ORDER — LABETALOL HYDROCHLORIDE 5 MG/ML
5 INJECTION, SOLUTION INTRAVENOUS EVERY 10 MIN PRN
Status: DISCONTINUED | OUTPATIENT
Start: 2021-10-16 | End: 2021-10-16 | Stop reason: HOSPADM

## 2021-10-16 RX ORDER — DIPHENHYDRAMINE HYDROCHLORIDE 50 MG/ML
12.5 INJECTION INTRAMUSCULAR; INTRAVENOUS
Status: DISCONTINUED | OUTPATIENT
Start: 2021-10-16 | End: 2021-10-16 | Stop reason: HOSPADM

## 2021-10-16 RX ORDER — ONDANSETRON 2 MG/ML
4 INJECTION INTRAMUSCULAR; INTRAVENOUS DAILY PRN
Status: DISCONTINUED | OUTPATIENT
Start: 2021-10-16 | End: 2021-10-16 | Stop reason: HOSPADM

## 2021-10-16 RX ORDER — FENTANYL CITRATE 50 UG/ML
25 INJECTION, SOLUTION INTRAMUSCULAR; INTRAVENOUS EVERY 5 MIN PRN
Status: DISCONTINUED | OUTPATIENT
Start: 2021-10-16 | End: 2021-10-16 | Stop reason: HOSPADM

## 2021-10-16 RX ORDER — MORPHINE SULFATE 10 MG/ML
INJECTION, SOLUTION INTRAMUSCULAR; INTRAVENOUS PRN
Status: DISCONTINUED | OUTPATIENT
Start: 2021-10-16 | End: 2021-10-16 | Stop reason: SDUPTHER

## 2021-10-16 RX ORDER — FENTANYL CITRATE 50 UG/ML
INJECTION, SOLUTION INTRAMUSCULAR; INTRAVENOUS PRN
Status: DISCONTINUED | OUTPATIENT
Start: 2021-10-16 | End: 2021-10-16 | Stop reason: SDUPTHER

## 2021-10-16 RX ORDER — SODIUM CHLORIDE, SODIUM LACTATE, POTASSIUM CHLORIDE, CALCIUM CHLORIDE 600; 310; 30; 20 MG/100ML; MG/100ML; MG/100ML; MG/100ML
INJECTION, SOLUTION INTRAVENOUS CONTINUOUS
Status: DISCONTINUED | OUTPATIENT
Start: 2021-10-16 | End: 2021-10-16

## 2021-10-16 RX ORDER — DIPHENHYDRAMINE HYDROCHLORIDE 50 MG/ML
INJECTION INTRAMUSCULAR; INTRAVENOUS PRN
Status: DISCONTINUED | OUTPATIENT
Start: 2021-10-16 | End: 2021-10-16 | Stop reason: SDUPTHER

## 2021-10-16 RX ORDER — MAGNESIUM HYDROXIDE 1200 MG/15ML
LIQUID ORAL CONTINUOUS PRN
Status: COMPLETED | OUTPATIENT
Start: 2021-10-16 | End: 2021-10-16

## 2021-10-16 RX ORDER — MIDAZOLAM HYDROCHLORIDE 2 MG/2ML
1 INJECTION, SOLUTION INTRAMUSCULAR; INTRAVENOUS EVERY 10 MIN PRN
Status: DISCONTINUED | OUTPATIENT
Start: 2021-10-16 | End: 2021-10-16 | Stop reason: HOSPADM

## 2021-10-16 RX ORDER — LIDOCAINE HYDROCHLORIDE 10 MG/ML
1 INJECTION, SOLUTION EPIDURAL; INFILTRATION; INTRACAUDAL; PERINEURAL
Status: DISCONTINUED | OUTPATIENT
Start: 2021-10-16 | End: 2021-10-16 | Stop reason: HOSPADM

## 2021-10-16 RX ORDER — PROPOFOL 10 MG/ML
INJECTION, EMULSION INTRAVENOUS PRN
Status: DISCONTINUED | OUTPATIENT
Start: 2021-10-16 | End: 2021-10-16 | Stop reason: SDUPTHER

## 2021-10-16 RX ORDER — HYDRALAZINE HYDROCHLORIDE 20 MG/ML
5 INJECTION INTRAMUSCULAR; INTRAVENOUS EVERY 10 MIN PRN
Status: DISCONTINUED | OUTPATIENT
Start: 2021-10-16 | End: 2021-10-16 | Stop reason: HOSPADM

## 2021-10-16 RX ORDER — ONDANSETRON 2 MG/ML
4 INJECTION INTRAMUSCULAR; INTRAVENOUS
Status: DISCONTINUED | OUTPATIENT
Start: 2021-10-16 | End: 2021-10-16 | Stop reason: HOSPADM

## 2021-10-16 RX ORDER — ONDANSETRON 2 MG/ML
INJECTION INTRAMUSCULAR; INTRAVENOUS PRN
Status: DISCONTINUED | OUTPATIENT
Start: 2021-10-16 | End: 2021-10-16 | Stop reason: SDUPTHER

## 2021-10-16 RX ORDER — LIDOCAINE HYDROCHLORIDE 10 MG/ML
INJECTION, SOLUTION EPIDURAL; INFILTRATION; INTRACAUDAL; PERINEURAL PRN
Status: DISCONTINUED | OUTPATIENT
Start: 2021-10-16 | End: 2021-10-16 | Stop reason: SDUPTHER

## 2021-10-16 RX ORDER — KETOROLAC TROMETHAMINE 30 MG/ML
INJECTION, SOLUTION INTRAMUSCULAR; INTRAVENOUS PRN
Status: DISCONTINUED | OUTPATIENT
Start: 2021-10-16 | End: 2021-10-16 | Stop reason: SDUPTHER

## 2021-10-16 RX ORDER — FENTANYL CITRATE 50 UG/ML
50 INJECTION, SOLUTION INTRAMUSCULAR; INTRAVENOUS EVERY 5 MIN PRN
Status: COMPLETED | OUTPATIENT
Start: 2021-10-16 | End: 2021-10-16

## 2021-10-16 RX ORDER — ROCURONIUM BROMIDE 10 MG/ML
INJECTION, SOLUTION INTRAVENOUS PRN
Status: DISCONTINUED | OUTPATIENT
Start: 2021-10-16 | End: 2021-10-16 | Stop reason: SDUPTHER

## 2021-10-16 RX ORDER — CEFAZOLIN SODIUM 1 G/3ML
INJECTION, POWDER, FOR SOLUTION INTRAMUSCULAR; INTRAVENOUS PRN
Status: DISCONTINUED | OUTPATIENT
Start: 2021-10-16 | End: 2021-10-16 | Stop reason: SDUPTHER

## 2021-10-16 RX ORDER — NEOSTIGMINE METHYLSULFATE 5 MG/5 ML
SYRINGE (ML) INTRAVENOUS PRN
Status: DISCONTINUED | OUTPATIENT
Start: 2021-10-16 | End: 2021-10-16 | Stop reason: SDUPTHER

## 2021-10-16 RX ORDER — DEXAMETHASONE SODIUM PHOSPHATE 10 MG/ML
INJECTION INTRAMUSCULAR; INTRAVENOUS PRN
Status: DISCONTINUED | OUTPATIENT
Start: 2021-10-16 | End: 2021-10-16 | Stop reason: SDUPTHER

## 2021-10-16 RX ORDER — MIDAZOLAM HYDROCHLORIDE 1 MG/ML
INJECTION INTRAMUSCULAR; INTRAVENOUS PRN
Status: DISCONTINUED | OUTPATIENT
Start: 2021-10-16 | End: 2021-10-16 | Stop reason: SDUPTHER

## 2021-10-16 RX ORDER — FENTANYL CITRATE 50 UG/ML
50 INJECTION, SOLUTION INTRAMUSCULAR; INTRAVENOUS EVERY 5 MIN PRN
Status: DISCONTINUED | OUTPATIENT
Start: 2021-10-16 | End: 2021-10-16 | Stop reason: HOSPADM

## 2021-10-16 RX ORDER — ETONOGESTREL 68 MG/1
68 IMPLANT SUBCUTANEOUS ONCE
COMMUNITY

## 2021-10-16 RX ORDER — PROMETHAZINE HYDROCHLORIDE 25 MG/ML
6.25 INJECTION, SOLUTION INTRAMUSCULAR; INTRAVENOUS
Status: DISCONTINUED | OUTPATIENT
Start: 2021-10-16 | End: 2021-10-16 | Stop reason: HOSPADM

## 2021-10-16 RX ADMIN — FENTANYL CITRATE 50 MCG: 50 INJECTION INTRAMUSCULAR; INTRAVENOUS at 11:35

## 2021-10-16 RX ADMIN — DEXTROSE MONOHYDRATE 2000 MG: 50 INJECTION, SOLUTION INTRAVENOUS at 23:38

## 2021-10-16 RX ADMIN — LIDOCAINE HYDROCHLORIDE 40 MG: 10 INJECTION, SOLUTION EPIDURAL; INFILTRATION; INTRACAUDAL; PERINEURAL at 07:49

## 2021-10-16 RX ADMIN — KETOROLAC TROMETHAMINE 30 MG: 30 INJECTION, SOLUTION INTRAMUSCULAR at 10:45

## 2021-10-16 RX ADMIN — SODIUM CHLORIDE, POTASSIUM CHLORIDE, SODIUM LACTATE AND CALCIUM CHLORIDE: 600; 310; 30; 20 INJECTION, SOLUTION INTRAVENOUS at 10:31

## 2021-10-16 RX ADMIN — FENTANYL CITRATE 50 MCG: 50 INJECTION INTRAMUSCULAR; INTRAVENOUS at 11:30

## 2021-10-16 RX ADMIN — MORPHINE SULFATE 4 MG: 10 INJECTION, SOLUTION INTRAMUSCULAR; INTRAVENOUS at 10:07

## 2021-10-16 RX ADMIN — DEXTROSE MONOHYDRATE 2000 MG: 50 INJECTION, SOLUTION INTRAVENOUS at 16:06

## 2021-10-16 RX ADMIN — FENTANYL CITRATE 50 MCG: 50 INJECTION INTRAMUSCULAR; INTRAVENOUS at 12:09

## 2021-10-16 RX ADMIN — FENTANYL CITRATE 50 MCG: 50 INJECTION, SOLUTION INTRAMUSCULAR; INTRAVENOUS at 07:49

## 2021-10-16 RX ADMIN — GABAPENTIN 300 MG: 300 CAPSULE ORAL at 13:44

## 2021-10-16 RX ADMIN — FENTANYL CITRATE 50 MCG: 50 INJECTION, SOLUTION INTRAMUSCULAR; INTRAVENOUS at 07:43

## 2021-10-16 RX ADMIN — MIDAZOLAM HYDROCHLORIDE 1 MG: 1 INJECTION, SOLUTION INTRAMUSCULAR; INTRAVENOUS at 07:05

## 2021-10-16 RX ADMIN — MORPHINE SULFATE 2 MG: 10 INJECTION, SOLUTION INTRAMUSCULAR; INTRAVENOUS at 08:36

## 2021-10-16 RX ADMIN — PROPOFOL 150 MG: 10 INJECTION, EMULSION INTRAVENOUS at 07:49

## 2021-10-16 RX ADMIN — METHOCARBAMOL TABLETS 750 MG: 500 TABLET, COATED ORAL at 21:11

## 2021-10-16 RX ADMIN — FENTANYL CITRATE 50 MCG: 50 INJECTION INTRAMUSCULAR; INTRAVENOUS at 12:04

## 2021-10-16 RX ADMIN — FENTANYL CITRATE 50 MCG: 50 INJECTION, SOLUTION INTRAMUSCULAR; INTRAVENOUS at 08:17

## 2021-10-16 RX ADMIN — CEFAZOLIN 2000 MG: 1 INJECTION, POWDER, FOR SOLUTION INTRAMUSCULAR; INTRAVENOUS at 08:05

## 2021-10-16 RX ADMIN — FENTANYL CITRATE 50 MCG: 50 INJECTION, SOLUTION INTRAMUSCULAR; INTRAVENOUS at 08:30

## 2021-10-16 RX ADMIN — ACETAMINOPHEN 1000 MG: 500 TABLET ORAL at 05:45

## 2021-10-16 RX ADMIN — DEXAMETHASONE SODIUM PHOSPHATE 10 MG: 10 INJECTION INTRAMUSCULAR; INTRAVENOUS at 08:00

## 2021-10-16 RX ADMIN — OXYCODONE 5 MG: 5 TABLET ORAL at 13:43

## 2021-10-16 RX ADMIN — SODIUM CHLORIDE, POTASSIUM CHLORIDE, SODIUM LACTATE AND CALCIUM CHLORIDE: 600; 310; 30; 20 INJECTION, SOLUTION INTRAVENOUS at 07:41

## 2021-10-16 RX ADMIN — GABAPENTIN 300 MG: 300 CAPSULE ORAL at 21:11

## 2021-10-16 RX ADMIN — MIDAZOLAM HYDROCHLORIDE 2 MG: 1 INJECTION, SOLUTION INTRAMUSCULAR; INTRAVENOUS at 07:43

## 2021-10-16 RX ADMIN — ROCURONIUM BROMIDE 50 MG: 10 INJECTION INTRAVENOUS at 07:49

## 2021-10-16 RX ADMIN — Medication 3 MG: at 10:27

## 2021-10-16 RX ADMIN — ONDANSETRON 4 MG: 2 INJECTION, SOLUTION INTRAMUSCULAR; INTRAVENOUS at 10:26

## 2021-10-16 RX ADMIN — OXYCODONE 5 MG: 5 TABLET ORAL at 22:54

## 2021-10-16 RX ADMIN — Medication 25 MG: at 08:00

## 2021-10-16 RX ADMIN — ACETAMINOPHEN 1000 MG: 500 TABLET ORAL at 21:11

## 2021-10-16 RX ADMIN — Medication 0.6 MG: at 10:27

## 2021-10-16 RX ADMIN — OXYCODONE 5 MG: 5 TABLET ORAL at 18:22

## 2021-10-16 ASSESSMENT — PULMONARY FUNCTION TESTS
PIF_VALUE: 13
PIF_VALUE: 1
PIF_VALUE: 13
PIF_VALUE: 10
PIF_VALUE: 15
PIF_VALUE: 15
PIF_VALUE: 13
PIF_VALUE: 12
PIF_VALUE: 15
PIF_VALUE: 10
PIF_VALUE: 13
PIF_VALUE: 12
PIF_VALUE: 13
PIF_VALUE: 14
PIF_VALUE: 14
PIF_VALUE: 13
PIF_VALUE: 15
PIF_VALUE: 12
PIF_VALUE: 13
PIF_VALUE: 15
PIF_VALUE: 0
PIF_VALUE: 13
PIF_VALUE: 13
PIF_VALUE: 12
PIF_VALUE: 3
PIF_VALUE: 13
PIF_VALUE: 14
PIF_VALUE: 12
PIF_VALUE: 12
PIF_VALUE: 22
PIF_VALUE: 14
PIF_VALUE: 13
PIF_VALUE: 14
PIF_VALUE: 13
PIF_VALUE: 15
PIF_VALUE: 13
PIF_VALUE: 12
PIF_VALUE: 13
PIF_VALUE: 15
PIF_VALUE: 14
PIF_VALUE: 13
PIF_VALUE: 27
PIF_VALUE: 14
PIF_VALUE: 13
PIF_VALUE: 15
PIF_VALUE: 13
PIF_VALUE: 20
PIF_VALUE: 13
PIF_VALUE: 12
PIF_VALUE: 14
PIF_VALUE: 13
PIF_VALUE: 15
PIF_VALUE: 13
PIF_VALUE: 12
PIF_VALUE: 12
PIF_VALUE: 13
PIF_VALUE: 3
PIF_VALUE: 13
PIF_VALUE: 13
PIF_VALUE: 15
PIF_VALUE: 13
PIF_VALUE: 13
PIF_VALUE: 15
PIF_VALUE: 13
PIF_VALUE: 17
PIF_VALUE: 13
PIF_VALUE: 12
PIF_VALUE: 13
PIF_VALUE: 18
PIF_VALUE: 13
PIF_VALUE: 10
PIF_VALUE: 0
PIF_VALUE: 13
PIF_VALUE: 13
PIF_VALUE: 15
PIF_VALUE: 6
PIF_VALUE: 13
PIF_VALUE: 13
PIF_VALUE: 12
PIF_VALUE: 13
PIF_VALUE: 12
PIF_VALUE: 13
PIF_VALUE: 13
PIF_VALUE: 12
PIF_VALUE: 13
PIF_VALUE: 15
PIF_VALUE: 13
PIF_VALUE: 12
PIF_VALUE: 15
PIF_VALUE: 13
PIF_VALUE: 23
PIF_VALUE: 13
PIF_VALUE: 12
PIF_VALUE: 15
PIF_VALUE: 13
PIF_VALUE: 1
PIF_VALUE: 13
PIF_VALUE: 15
PIF_VALUE: 15
PIF_VALUE: 0
PIF_VALUE: 12
PIF_VALUE: 15

## 2021-10-16 ASSESSMENT — PAIN DESCRIPTION - PAIN TYPE
TYPE: ACUTE PAIN;SURGICAL PAIN
TYPE: ACUTE PAIN
TYPE: SURGICAL PAIN
TYPE: ACUTE PAIN;SURGICAL PAIN
TYPE: SURGICAL PAIN

## 2021-10-16 ASSESSMENT — PAIN DESCRIPTION - LOCATION
LOCATION: LEG

## 2021-10-16 ASSESSMENT — PAIN DESCRIPTION - FREQUENCY: FREQUENCY: CONTINUOUS

## 2021-10-16 ASSESSMENT — PAIN DESCRIPTION - DESCRIPTORS
DESCRIPTORS: BURNING
DESCRIPTORS: ACHING
DESCRIPTORS: BURNING
DESCRIPTORS: ACHING;DISCOMFORT
DESCRIPTORS: ACHING;BURNING
DESCRIPTORS: ACHING;CONSTANT

## 2021-10-16 ASSESSMENT — PAIN DESCRIPTION - ORIENTATION
ORIENTATION: RIGHT
ORIENTATION: RIGHT
ORIENTATION: RIGHT;INNER;LOWER
ORIENTATION: RIGHT;INNER;LOWER
ORIENTATION: RIGHT

## 2021-10-16 ASSESSMENT — PAIN SCALES - GENERAL
PAINLEVEL_OUTOF10: 7
PAINLEVEL_OUTOF10: 5
PAINLEVEL_OUTOF10: 5
PAINLEVEL_OUTOF10: 8
PAINLEVEL_OUTOF10: 4
PAINLEVEL_OUTOF10: 10
PAINLEVEL_OUTOF10: 2
PAINLEVEL_OUTOF10: 6
PAINLEVEL_OUTOF10: 2
PAINLEVEL_OUTOF10: 4
PAINLEVEL_OUTOF10: 10

## 2021-10-16 ASSESSMENT — PAIN - FUNCTIONAL ASSESSMENT: PAIN_FUNCTIONAL_ASSESSMENT: 0-10

## 2021-10-16 ASSESSMENT — PAIN DESCRIPTION - ONSET: ONSET: ON-GOING

## 2021-10-16 NOTE — ANESTHESIA PRE PROCEDURE
Department of Anesthesiology  Preprocedure Note       Name:  THE [facility]   Age:  22 y.o.  :  2000                                          MRN:  [id number]         Date:  10/16/2021      Surgeon: Aurea Gaines):  Cornelia Espinoza,     Procedure: Procedure(s):  TIBIA IM NAIL INSERTION, 3080 BED WITH RADIOLUSCENT EXTENSION, TRAUMA TOOLBOX, C-ARM, X-RAY, TRAVEL TRACTION SET(HAVE AVAILABLE), REP NOTIFIED    Medications prior to admission:   Prior to Admission medications    Not on File       Current medications:    Current Facility-Administered Medications   Medication Dose Route Frequency Provider Last Rate Last Admin    ondansetron (ZOFRAN) 4 MG/2ML injection             sodium chloride flush 0.9 % injection 5-40 mL  5-40 mL IntraVENous 2 times per day Fer, DO   10 mL at 10/15/21 2333    sodium chloride flush 0.9 % injection 5-40 mL  5-40 mL IntraVENous PRN Fer [place], DO        0.9 % sodium chloride infusion  25 mL IntraVENous PRN Fer , DO        polyethylene glycol (GLYCOLAX) packet 17 g  17 g Oral Daily Fer, DO        lactated ringers infusion   IntraVENous Continuous Fer ,  mL/hr at 10/15/21 2344 New Bag at 10/15/21 2344    acetaminophen (TYLENOL) tablet 1,000 mg  1,000 mg Oral 3 times per day , DO   1,000 mg at 10/16/21 0545    methocarbamol (ROBAXIN) tablet 750 mg  750 mg Oral 4x Daily Fer, DO   750 mg at 10/15/21 2337    gabapentin (NEURONTIN) capsule 300 mg  300 mg Oral TID Fer, DO   300 mg at 10/15/21 2337    fentaNYL (SUBLIMAZE) injection 50 mcg  50 mcg IntraVENous Q1H PRN Fer [place], DO        oxyCODONE (ROXICODONE) immediate release tablet 5 mg  5 mg Oral Q4H PRN Fer [place], DO        bisacodyl (DULCOLAX) suppository 10 mg  10 mg Rectal Daily PRN Fer [place], DO        ondansetron [facility]) injection 4 mg  4 mg IntraVENous Q6H PRN Fer [place], DO        propofol 200 MG/20ML injection             propofol injection  5-50 mcg/kg/min IntraVENous Titrated Krista Hussein,         ondansetron [facility]) 4 MG/2ML injection             ceFAZolin (ANCEF) 2000 mg in dextrose 5 % 50 mL IVPB  2,000 mg IntraVENous On Call to [address], DO           Allergies: Allergies   Allergen Reactions    Codeine Anaphylaxis    Shellfish-Derived Products Anaphylaxis       Problem List:    Patient Active Problem List   Diagnosis Code    Trauma T14.90XA       Past Medical History:  No past medical history on file. Past Surgical History:  No past surgical history on file. Social History:    Social History     Tobacco Use    Smoking status: Not on file   Substance Use Topics    Alcohol use: Not on file                                Counseling given: Not Answered      Vital Signs (Current):   Vitals:    10/15/21 2140 10/15/21 2146 10/15/21 2200 10/16/21 0130   BP: 95/61 88/61 95/72 100/68   Pulse: 62 61 104 75   Resp:    16   Temp:    98.3 °F (36.8 °C)   TempSrc:    Oral   SpO2: 99% 100% 100% 99%   Weight:       Height:                                                  BP Readings from Last 3 Encounters:   10/16/21 100/68       NPO Status:                                                                                 BMI:   Wt Readings from Last 3 Encounters:   10/15/21 95 lb (43.1 kg)     Body mass index is 17.38 kg/m².     CBC:   Lab Results   Component Value Date    WBC 15.2 10/15/2021    RBC 3.68 10/15/2021    HGB 11.7 10/15/2021    HCT 36.5 10/15/2021    MCV 99.2 10/15/2021    RDW 11.8 10/15/2021     10/15/2021       CMP:   Lab Results   Component Value Date     10/15/2021    K 3.9 10/15/2021     10/15/2021    CO2 19 10/15/2021    BUN 10 10/15/2021    CREATININE 0.50 10/15/2021    GFRAA NOT REPORTED 10/15/2021    LABGLOM NOT REPORTED 10/15/2021    GLUCOSE 115 10/15/2021       POC Tests: No results for input(s): POCGLU, POCNA, POCK, POCCL, POCBUN, Terrance Lemus in the last 72 hours. Coags:   Lab Results   Component Value Date    PROTIME 12.1 10/15/2021    INR 1.1 10/15/2021    APTT 25.3 10/15/2021       HCG (If Applicable): No results found for: PREGTESTUR, PREGSERUM, HCG, HCGQUANT     ABGs: No results found for: PHART, PO2ART, PTE3TIU, JRO1IND, BEART, Y7UZZJIC     Type & Screen (If Applicable):  No results found for: LABABO, LABRH    Drug/Infectious Status (If Applicable):  No results found for: HIV, HEPCAB    COVID-19 Screening (If Applicable):   Lab Results   Component Value Date    COVID19 Not Detected 10/15/2021           Anesthesia Evaluation  Patient summary reviewed no history of anesthetic complications:   Airway: Mallampati: II        Dental:          Pulmonary:Negative Pulmonary ROS and normal exam  breath sounds clear to auscultation                             Cardiovascular:Negative CV ROS  Exercise tolerance: good (>4 METS),           Rhythm: regular  Rate: normal                    Neuro/Psych:   Negative Neuro/Psych ROS              GI/Hepatic/Renal: Neg GI/Hepatic/Renal ROS            Endo/Other: Negative Endo/Other ROS                    Abdominal:             Vascular: negative vascular ROS. Other Findings:             Anesthesia Plan      general     ASA 2       Induction: intravenous. Anesthetic plan and risks discussed with patient. Plan discussed with CRNA. Impression/Plan: 22 y.o. female who sustained a forklift injury, with:     1. Right closed tibia-fibula fracture  2.  Right hallux P1 fracture  3. 1st MT head fracture, 2nd MT nondisplaced base fracture        - OR today with ortho for right tibia IMN/ORIF        Gal Hendricks MD   10/16/2021

## 2021-10-16 NOTE — CONSULTS
Hernan Lagunas      CC/Reason for consult:  Trauma priority/alert    HPI: The patient is a 22 y.o. female who was hit in the right leg by a forklift while at work. She was initially evaluated at [facility] and radiographs demonstrated a right distal tib-fib fracture. They attempted reduction at the prior hospital using Ketamine sedation. Patient did have an emergence reaction, but otherwise tolerated it well. Initial concern was for vascular injury so patient was transferred to [facility] for further evaluation. Patient arrived to [address] [place] [address] as a trauma via EMS with GCS 15 and hemodynamically stable. She denies hitting her head or having LOC. Denies pain anywhere else. Denies numbness or tingling, but endorse dysesthesias to the right foot. Not on blood thinners. Denies being on any medication. No diabetes. Denies smoking but does vape occasionally. No past medical history on file. No past surgical history on file. Prior to Admission medications    Not on File      Social History     Socioeconomic History    Marital status: Not on file     Spouse name: Not on file    Number of children: Not on file    Years of education: Not on file    Highest education level: Not on file   Occupational History    Not on file   Tobacco Use    Smoking status: Not on file   Substance and Sexual Activity    Alcohol use: Not on file    Drug use: Not on file    Sexual activity: Not on file   Other Topics Concern    Not on file   Social History Narrative    Not on file     Social Determinants of Health     Financial Resource Strain:     Difficulty of Paying Living Expenses:    Food Insecurity:     Worried About Running Out of Food in the Last Year:     [address] in the Last Year:    Transportation Needs:     Lack of Transportation (Medical):      Lack of Transportation (Non-Medical):    Physical Activity:     Days of Exercise per Week:     Minutes of Exercise per Session:    Stress:     Feeling of Stress :    Social Connections:     Frequency of Communication with Friends and Family:     Frequency of Social Gatherings with Friends and Family:     Attends [employer] Services:     Active Member of Clubs or Organizations:     Attends Club or Organization Meetings:     Marital Status:    Intimate Partner Violence:     Fear of Current or Ex-Partner:     Emotionally Abused:     Physically Abused:     Sexually Abused:      No family history on file. Allergies: Patient has no allergy information on record. ROS:  General: - LOC. CV: No chest pain. Resp: No SOB. MSK: Right leg and foot pain. 10 point ROS negative other than stated above    PE:  Weight 95 lb (43.1 kg). Gen: Alert and oriented, NAD, cooperative. Head: Normocephalic, atraumatic. Cardiovascular: Regular rate. Respiratory: Chest symmetric, no accessory muscle use. Pelvis: Stable to anterior and lateral compression. RUE: Skin intact. No ecchymoses, abrasion, deformity, or lacerations. Non tender to palpation. Compartments soft. Uln/Med/AIN/PIN/Rad nerve motor intact. Ax/MCN/Med/Uln/Rad nerves SILT. Radial pulse 2+ with BCR.    LUE: Skin intact. No ecchymoses, abrasion, deformity, or lacerations. Non tender to palpation. Compartments soft. Uln/Med/AIN/PIN/Rad nerve motor intact. Ax/MCN/Med/Uln/Rad nerves SILT. Radial pulse 2+ with BCR. RLE: Skin intact. Ecchymosis and TTP to the dorsum and plantar aspect of the midfoot. Mild crepitus to the midfoot. Obvious deformity to the distal tibia region. Compartments soft. EHL/FHL/TA/GS motor intact. Sural/Saph/SPN/DPN SILT, but does have hyperesthesias to the dorsum of the foot. Negative Lachman, but remaining knee ligamentous exam difficult due to known fracture. DP and PT pulse  with BCR. LLE: Small 1cm laceration over the medial malleolus region. Non tender to palpation throughout ankle and leg. Compartments soft. EHL/FHL/TA/GS motor intact. Sural/Saph/SPN/DPN SILT. (-) log roll. Able to perform straight leg raise. Knee ligaments stable to varus/valgus stress, (-) Lachman. DP/PT pulses 2+ with BCR. Recent Labs     10/15/21  1925   WBC 15.2*   HGB 11.7*   HCT 36.5      INR 1.1      K 3.9   BUN 10   CREATININE 0.50   GLUCOSE 115*      Imaging   RIGHT TIB-FIB/ANKLE radiographs demonstrate a transverse distal tibia and fibula fracture with extension of the tibia fracture toward the tibio-talar joint. Post reduction films demonstrate improved alignment and adequate length. RIGHT FOOT radiographs demonstrate a great toe proximal phalanx intra-articular fracture with avulsion of both the medial proximal phalanx and distal metatarsal head. No dislocations present. Possible widening of the lisfranc joint but no obvious bony involvement at this joint. Assessment/Plan: 22 y.o. female who was hit by a forklift, with the following injuries:    1. Right closed distal tibia and fibula fracture      - To OR tomorrow, 10/16 with Dr. Alfred Beltre for right tibia IMN vs ORIF  - NWB to the RLE  - Patient sedated and fracture reduced. Long leg splint applied  - Follow up pre op labs  - NPO at midnight  - Abx on call to the OR  - Consent and marked for surgery  - Hold chemical AC for surgery  - Pain control per primary. Recommended ortho trauma pain regimen: Acetaminophen 1000mg q6h, Naproxen 500mg BID, Gabapentin 300mg TID, Cyclobenzaprine 10mg q6h, Oxycodone 10mg q4-6h  - Ice and elevate. Will recheck compartments in 4 hours    Electronically signed by Shadi Fagan DO on 10/15/2021 at 8:16 PM.    Procedure note: Risks, benefits, and alternatives have been discussed regarding closed reduction of the right tiba under procedural sedation with Propofol per the ED staff. Patient agreed to move forward with the proposed procedure.   Under IV sedation by ED staff we proceeded to manually reduce the fracture with appreciable motion indicating improved alignment. At this time post reduction films were obtained demonstrating improved interval alignment. Splint was applied at this point and post splint films were obtained showing a stable reduction. Patient tolerated the procedure well and expressed interval improvement in symptoms. Post reduction neurovascular exam remained unchanged. All questions and concerns were addressed at this point.

## 2021-10-16 NOTE — PROGRESS NOTES
Physical Therapy          Physical Therapy Cancel Note      DATE: 10/16/2021    Ryan Manuel  MRN: [id number]   : 2000      Patient not seen this date for Physical Therapy due to:     Other: surgery-OR today with ortho for right tibia IMN/ORIF      Electronically signed by Jacinta De PT on 10/16/2021 at 8:20 AM

## 2021-10-16 NOTE — PROGRESS NOTES
FINDINGS: There is no evidence of acute fracture. There is normal alignment. No acute joint abnormality. No focal osseous lesion. No focal soft tissue abnormality. No acute osseous abnormality. XR KNEE RIGHT (3 VIEWS)    Result Date: 10/15/2021  EXAMINATION: THREE XRAY VIEWS OF THE RIGHT KNEE 10/15/2021 11:22 pm COMPARISON: None. HISTORY: ORDERING SYSTEM PROVIDED HISTORY: trauma TECHNOLOGIST PROVIDED HISTORY: trauma Reason for Exam: Trauma Acuity: Acute Type of Exam: Initial FINDINGS: No fracture, dislocation, or focal osseous lesion is noted. No significant soft tissue abnormality seen. No fracture or dislocation. XR TIBIA FIBULA RIGHT (2 VIEWS)    Result Date: 10/16/2021  EXAMINATION: XRAY VIEWS OF THE RIGHT TIBIA AND FIBULA 10/16/2021 11:42 am COMPARISON: October 15 21 HISTORY: ORDERING SYSTEM PROVIDED HISTORY: post op in pacu TECHNOLOGIST PROVIDED HISTORY: post op in pacu FINDINGS: There is internal fixation with intramedullary christofer associated screw supporting fracture of the distal shaft of the tibia. Plate screw fixation of the distal fibular fracture noted. Alignment is within normal limits. There is overlying splint cast.     Internal fixation of fractures distal tibia and fibula in expected/improved alignment from prior exam.     XR TIBIA FIBULA RIGHT (2 VIEWS)    Result Date: 10/15/2021  EXAMINATION: 1 XRAY VIEWS OF THE RIGHT TIBIA AND FIBULA (x2), 2 images of the right tibia and fibula 10/15/2021 9:54 pm COMPARISON: 10/15/2021 HISTORY: ORDERING SYSTEM PROVIDED HISTORY: Post-splint TECHNOLOGIST PROVIDED HISTORY: Post-splint Acuity: Acute Type of Exam: Ongoing Post reduction x2 and post splint FINDINGS: Sequential post reduction images of the right tibia and fibula show improved alignment of the comminuted distal tibial diametaphyseal fracture and fibular fracture at the same level. The ankle and foot are obscured by hands performing the reduction.   There is persistent slight displacement of the major fracture fragments. Subsequent images with a splint show similar similar alignment of the fractures now immobilized in a splint. CT may be useful for further evaluation. Post reduction images show improved alignment of the distal tibial and fibular fractures with subsequent splint placement. XR TIBIA FIBULA RIGHT (2 VIEWS)    Result Date: 10/15/2021  EXAMINATION: 1 XRAY VIEWS OF THE RIGHT TIBIA AND FIBULA (x2), 2 images of the right tibia and fibula 10/15/2021 9:54 pm COMPARISON: 10/15/2021 HISTORY: ORDERING SYSTEM PROVIDED HISTORY: Post-splint TECHNOLOGIST PROVIDED HISTORY: Post-splint Acuity: Acute Type of Exam: Ongoing Post reduction x2 and post splint FINDINGS: Sequential post reduction images of the right tibia and fibula show improved alignment of the comminuted distal tibial diametaphyseal fracture and fibular fracture at the same level. The ankle and foot are obscured by hands performing the reduction. There is persistent slight displacement of the major fracture fragments. Subsequent images with a splint show similar similar alignment of the fractures now immobilized in a splint. CT may be useful for further evaluation. Post reduction images show improved alignment of the distal tibial and fibular fractures with subsequent splint placement. XR TIBIA FIBULA RIGHT (2 VIEWS)    Result Date: 10/15/2021  EXAMINATION: 1 XRAY VIEWS OF THE RIGHT TIBIA AND FIBULA (x2), 2 images of the right tibia and fibula 10/15/2021 9:54 pm COMPARISON: 10/15/2021 HISTORY: ORDERING SYSTEM PROVIDED HISTORY: Post-splint TECHNOLOGIST PROVIDED HISTORY: Post-splint Acuity: Acute Type of Exam: Ongoing Post reduction x2 and post splint FINDINGS: Sequential post reduction images of the right tibia and fibula show improved alignment of the comminuted distal tibial diametaphyseal fracture and fibular fracture at the same level. The ankle and foot are obscured by hands performing the reduction. There is persistent slight displacement of the major fracture fragments. Subsequent images with a splint show similar similar alignment of the fractures now immobilized in a splint. CT may be useful for further evaluation. Post reduction images show improved alignment of the distal tibial and fibular fractures with subsequent splint placement. XR TIBIA FIBULA RIGHT (2 VIEWS)    Result Date: 10/15/2021  EXAMINATION: 2 XRAY VIEWS OF THE RIGHT TIBIA AND FIBULA 10/15/2021 7:58 pm COMPARISON: None. HISTORY: ORDERING SYSTEM PROVIDED HISTORY: trauma TECHNOLOGIST PROVIDED HISTORY: trauma FINDINGS: Comminuted displaced fractures distal tibia and fibula obscured by a fiberglass splint. Knee and ankle in gross anatomic alignment. Fractures distal tibia and fibula     XR ANKLE RIGHT (2 VIEWS)    Result Date: 10/15/2021  EXAMINATION: 2 XRAY VIEWS OF THE RIGHT ANKLE 10/15/2021 7:58 pm COMPARISON: None. HISTORY: ORDERING SYSTEM PROVIDED HISTORY: trauma TECHNOLOGIST PROVIDED HISTORY: trauma FINDINGS: Comminuted fractures distal tibia and fibula with 100% lateral displacement. Detail obscured by a fiberglass splint. Fractures distal tibia and fibula     XR FOOT RIGHT (2 VIEWS)    Result Date: 10/15/2021  EXAMINATION: TWO XRAY VIEWS OF THE RIGHT FOOT 10/15/2021 7:58 pm COMPARISON: None. HISTORY: ORDERING SYSTEM PROVIDED HISTORY: trauma TECHNOLOGIST PROVIDED HISTORY: trauma FINDINGS: Intra-articular comminuted minimally displaced fracture base of the 1st proximal phalanx and 1st metatarsal head. Soft tissue swelling. Fracture 1st metatarsal head and proximal phalanx     XR CHEST PORTABLE    Result Date: 10/15/2021  EXAMINATION: ONE XRAY VIEW OF THE CHEST 10/15/2021 7:57 pm COMPARISON: None. HISTORY: ORDERING SYSTEM PROVIDED HISTORY: trauma TECHNOLOGIST PROVIDED HISTORY: trauma FINDINGS: The lungs are clear. The cardiomediastinal contour is unremarkable. No pneumothorax or pleural effusion is seen.  The visualized bones are grossly intact. No acute cardiopulmonary abnormality. CT TIBIA FIBULA RIGHT WO CONTRAST    Result Date: 10/16/2021  EXAMINATION: CT OF THE RIGHT TIBIA AND FIBULA WITHOUT CONTRAST 10/15/2021 10:20 pm TECHNIQUE: CT of the right tibia and fibula was performed without the administration of intravenous contrast.  Multiplanar reformatted images are provided for review. Dose modulation, iterative reconstruction, and/or weight based adjustment of the mA/kV was utilized to reduce the radiation dose to as low as reasonably achievable. COMPARISON: None. HISTORY ORDERING SYSTEM PROVIDED HISTORY: pre op planning TECHNOLOGIST PROVIDED HISTORY: pre op planning Is the patient pregnant?->No Reason for Exam: pre op FINDINGS: Bones: Acute comminuted nondisplaced fracture of distal tibial shaft is noted. A nondisplaced sagittal oblique fracture line extends inferiorly to the lateral aspect of the tibial plafond at the level of the ankle joint. In addition, a minimally displaced fracture of the distal fibular shaft is noted with the distal fracture fragment displaced medially for approximately 0.4 cm. Soft Tissue: No radiopaque foreign body is seen. There is no soft tissue gas. Joint: No significant ankle or subtalar joint effusion is noted. Acute fractures of distal tibia and fibula as above. CT FOOT RIGHT WO CONTRAST    Result Date: 10/16/2021  EXAMINATION: CT OF THE RIGHT FOOT WITHOUT CONTRAST 10/15/2021 10:20 pm TECHNIQUE: CT of the right foot was performed without the administration of intravenous contrast.  Multiplanar reformatted images are provided for review. Dose modulation, iterative reconstruction, and/or weight based adjustment of the mA/kV was utilized to reduce the radiation dose to as low as reasonably achievable. COMPARISON: None. HISTORY ORDERING SYSTEM PROVIDED HISTORY: pre op planning TECHNOLOGIST PROVIDED HISTORY: Include ankle.  Dr. Lynsey Rincon protocol pre op planning Is the patient pregnant?->No Reason for Exam: pre op dr roman protocol FINDINGS: Bones: Acute comminuted intra-articular fracture of the base and proximal shaft of the 1st proximal phalanx is seen. There is acute nondisplaced fracture of the medial aspect of 1st metatarsal head. Also, acute nondisplaced fracture of the dorsal aspect of the 2nd metatarsal head is noted. Distal tibial and fibular fractures are redemonstrated Soft Tissue: Diffuse soft tissue swelling on the dorsum of the foot is noted, compatible with hematoma. There is no radiopaque foreign body. Acute fractures in the forefoot as above. FLUORO FOR SURGICAL PROCEDURES    Result Date: 10/16/2021  Radiology exam is complete. No Radiologist dictation. Please follow up with ordering provider. PHYSICAL EXAM:   GCS:  4 - Opens eyes on own   6 - Follows simple motor commands  5 - Alert and oriented    Pupil size:  Left 3 mm Right 3 mm  Pupil reaction: Yes  Wiggles fingers: Left Yes Right Yes  Hand grasp:   Left normal   Right normal  Wiggles toes: Left Yes    Right Yes  Plantar flexion: Left normal  Right splinted      /86   Pulse 66   Temp 97.6 °F (36.4 °C) (Oral)   Resp 16   Ht 5' 2\" (1.575 m)   Wt 95 lb (43.1 kg)   LMP 10/16/2021   SpO2 100%   BMI 17.38 kg/m²   General appearance: alert, appears stated age and cooperative  Head: Normocephalic, without obvious abnormality, atraumatic  Eyes: conjunctivae/corneas clear. PERRL, EOM's intact. Fundi benign. Neck: no adenopathy, no JVD and supple, symmetrical, trachea midline  Back: symmetric, no curvature. ROM normal. No CVA tenderness.   Lungs: clear to auscultation bilaterally  Heart: regular rate and rhythm  Abdomen: soft, non-tender; bowel sounds normal; no masses,  no organomegaly  Pelvic: stable pelvis, purewick in place  Extremities: right leg splinted, able to wiggle toes, tender; left leg without pain and normal ROM  Skin: Skin color, texture, turgor normal. No rashes or lesions or scattered bruises on right thigh/buttock  Neurologic: Grossly normal    Spine:     Spine Tenderness ROM   Cervical 0 /10 Normal   Thoracic 0 /10 Normal   Lumbar 0 /10 Normal     Musculoskeletal    Joint Tenderness Swelling ROM   Right shoulder absent absent normal   Left shoulder absent absent normal   Right elbow absent absent normal   Left elbow absent absent normal   Right wrist absent absent normal   Left wrist absent absent normal   Right hand grasp absent absent normal   Left hand grasp absent absent normal   Right hip absent absent normal   Left hip absent absent normal   Right knee absent absent normal   Left knee absent absent normal   Right ankle absent absent normal   Left ankle absent absent normal   Right foot absent absent normal   Left foot absent absent normal           CONSULTS: Ortho    PROCEDURES:   1. Open treatment with intramedullary nail right distal tibia, CPT 86553  2. Open reduction internal fixation right distal fibula, CPT 13306  3. Exam under anesthesia of the right lisfranc joint and 1st MTJ; 27692    INJURIES:        Patient Active Problem List   Diagnosis    Trauma   1. Right distal tibia fracture  2. Right distal fibula fracture  3. Right 1st P1 hallux fracture and 1st MT head avulsion      Assessment/Plan:     NEUROLOGIC:  PROBLEMS:  1. Pain control with MMPT    GI/NUTRITION:  PROBLEMS:  1. gen diet    MSK:  1. Right distal tibia fracture  2. Right distal fibula fracture  3.  Right 1st P1 hallux fracture and 1st MT head avulsion    -ortho following along, POST OP from IMN tibial, ORIF fibula  -NWB RLE  -start lovenox tomorrow  -PT/OT    DISPOSITION:   Continue medsur

## 2021-10-16 NOTE — PLAN OF CARE
Orthopedic post-op plan:    Chanel Martin is a 22 y.o. female who is POD0 from IMN of comminuted distal right tibia and ORIF of right distal fibula, and EUA of right lisfranc/1st MTJ.    1. NWB to RLE  2. Complete post op antibiotics  3. Maintain splint  4. Post op X-rays  5. Ok to eat from ortho standpoint  6. Ok to restart chemical AC on POD1  7. Ice and elevate  8. PT/OT eval and treat  9. Recommended ortho trauma pain regimen: Acetaminophen 1000mg q6h, Naproxen 500mg BID, Gabapentin 300mg TID, Cyclobenzaprine 10mg q6h, Oxycodone 10mg q4-6h  10.  Ok to discharge once 2 post op dose abx is completed    Electronically signed by Shanelle Powell DO on 10/16/2021 at 11:08 AM.

## 2021-10-16 NOTE — ED NOTES
Bed: 22  Expected date:   Expected time:   Means of arrival:   Comments:  98 Cecilia Pepe RN  10/15/21 8095
Patient educated on surgery procedure and signed consent form for surgery. Patient informed NPO at midnight.   Patient given iced water     Marcelina Varela RN  10/15/21 6188
Type 2 diabetes mellitus

## 2021-10-16 NOTE — CARE COORDINATION
Case Management Initial Discharge Plan  THE [facility],             Met with:patient and mother to discuss discharge plans. Information verified: address, contacts, phone number, , insurance Yes  Insurance Provider: none, mother states workman's compensation. Emergency Contact/Next of Kin name & number: Tessy Steven, mother at [phone]  Who are involved in patient's support system? Parents, stepfather and boyfriend    PCP: No primary care provider on file. Date of last visit: none, mother states she will choose a PMD near them      Discharge Planning    Living Arrangements:  Children, Spouse/Significant Other, Family Members     Home has 2 stories  3 stairs to climb to get into front door, 16stairs to climb to reach second floor  Location of bedroom/bathroom in home 2nd floor    Patient able to perform ADL's:Independent    Current Services (outpatient & in home) none  DME equipment: n/a  DME provider: n/a    Is patient receiving oral anticoagulation therapy? No    If indicated:   Physician managing anticoagulation treatment: n/a  Where does patient obtain lab work for ATC treatment? n/a      Potential Assistance Needed:  N/A    Patient agreeable to home care: No  [place] of choice provided:  no    Prior SNF/Rehab Placement and Facility: none  Agreeable to SNF/Rehab: No  [place] of choice provided: no     Evaluation: n/a    Expected Discharge date:       Patient expects to be discharged to: If home: is the family and/or caregiver wiling & able to provide support at home? yes  Who will be providing this support?  Parents and stepfather and boyfriend    Follow Up Appointment: Best Day/ Time:      Transportation provider: Mother   Transportation arrangements needed for discharge: No    Readmission Risk              Risk of Unplanned Readmission:  7             Does patient have a readmission risk score greater than 14?: No  If yes, follow-up appointment must be made within 7 days of discharge. Goals of Care:       Educated pt and mother on transitional options, provided freedom of choice and are agreeable with plan      Discharge Plan: Home independently with mother. Reported to unit RN, IV was leaking. Mother declines HC, states she was in [facility] for couple decades and pt has plenty of support.            Electronically signed by Peri Galvan RN on 10/16/21 at 5:20 PM EDT

## 2021-10-16 NOTE — PROGRESS NOTES
Orthopedic Progress Note    Patient:  THE [facility], [place], operated by [facility], 22 y.o. female  YOB: 2000       Subjective:  Patient seen and examined. Plan for OR today with ortho for right tibia IMN. No complaints or concerns. Pain controlled on current regimen. No acute issues overnight. Denies fever, HA, CP, SOB. Mild nausea/vomitting due to pain meds but otherwise tolerable. Slept some overnight. NPO since midnight. Objective:   Vitals:    10/16/21 0130   BP: 100/68   Pulse: 75   Resp: 16   Temp: 98.3 °F (36.8 °C)   SpO2: 99%     Gen: NAD, cooperative    Cardiovascular: Regular rate    Respiratory: Symmetric chest rise. No accessory muscle use    RLE: Splint on which is c/d/i. Compartments assessed which are soft and compressible. Able to flex and extend her exposed digits. Sensation intact to exposed regions including the anterior leg, dorsum of the foot and exposed toes. DP pulse 2+. Recent Labs     10/15/21  1925   WBC 15.2*   HGB 11.7*   HCT 36.5      INR 1.1      K 3.9   BUN 10   CREATININE 0.50   GLUCOSE 115*      Anticoag: Held  Abx: On call to the OR    Impression/Plan: 22 y.o. female who sustained a forklift injury, with:    1. Right closed tibia-fibula fracture  2. Right hallux P1 fracture  3. 1st MT head fracture, 2nd MT nondisplaced base fracture      - OR today with ortho for right tibia IMN/ORIF  - NWB to RLE  - Maintain splint  - Last hgb 11.7.  F/u rpt this morning  - NPO since midnight  - Abx on call to the OR  - Consent in chart, extremity marked  - Hold chemical AC for surgery  - Recommended ortho trauma pain regimen: Acetaminophen 1000mg q6h, Naproxen 500mg BID, Gabapentin 300mg TID, Cyclobenzaprine 10mg q6h, Oxycodone 10mg q4-6h  - Ice and elevate for pain and swelling    Electronically signed by Jean-Paul Lester DO, on 10/16/2021 at 5:47 AM.

## 2021-10-16 NOTE — BRIEF OP NOTE
Brief Postoperative Note      Patient: Katherne Sicard  YOB: 2000  MRN: [id number]  [facility]: [id number]    Date of Procedure: 10/16/2021    Pre-Op Diagnosis:   1. Right distal tibia fracture  2. Right distal fibula fracture  3. Right 1st P1 hallux fracture and 1st MT head avulsion    Post-Op Diagnosis:   1. Right distal tibia fracture  2. Right distal fibula fracture  3. Right 1st P1 hallux fracture and 1st MT head avulsion       Procedure(s):  1. Open treatment with intramedullary nail right distal tibia, CPT 98074  2. Open reduction internal fixation right distal fibula, CPT 07015  3. Exam under anesthesia of the right lisfranc joint and 1st MTJ; 90881    Surgeon(s):  Shilo Chao DO    Assistant:  Resident: Carli Law DO; Bro Rolon DO    Anesthesia: General    Estimated Blood Loss (mL): 50 cc    Fluids: 1200 cc crystalloid    TT: 53 minutes    Complications: None    Specimens:   * No specimens in log *    Implants:  Synthes 9 x 300 mm tibial nail - advanced  Synthes 7 hole 2.7 mm LCP  Implant Name Type Inv.  Item Serial No.  Lot No. LRB No. Used Action   SCREW BNE L12MM DIA2.7MM MARCELL S STL ST T8 STARDRV RECESS  SCREW BNE L12MM DIA2.7MM MARCELL S STL ST T8 STARDRV RECESS  DEPUY SYNTHES [place]-  Right 3 Implanted   PLATE BNE [id number] 7 H BILAT S STL BLAIR COMPR LO PROF FOR 2.4MM  PLATE BNE [id number] 7 H BILAT S STL BLAIR COMPR LO PROF FOR 2.4MM  DEPUY SYNTHES [place]-WD  Right 1 Implanted   SCREW BNE L14MM DIA2.7MM MARCELL S STL ST T8 STARDRV RECESS  SCREW BNE L14MM DIA2.7MM MARCELL S STL ST T8 STARDRV RECESS  DEPUY SYNTHES [place]-  Right 1 Implanted   DEPUY SYNTHES TIBIAL NAIL-ADVANCED 9MM,300MM, REF [id number], LOT [id number], EXP 05-, MATERIAL Ti-6Al-      Right 1 Implanted   LOCKING SCREW FOR IM NAIL 05MM, 30MM, XL25, REF [id number], LOT [id number], EXP 06-      Right 1 Implanted   [facility] FOR IM NAIL 05MM,38MM, XL25, REF [id number], LOT [id number], EXP 06-      Right 1 Implanted [facility] SCREW FOR IM NAIL 05MM, 28MM XL25,REF [id number], LOT [id number], EXP 06-      Right 1 Implanted   [facility] FOR IM NAIL 05MM, 32MM XL25, REF [id number], LOT [id number], EXP 06-      Right 1 Implanted   [facility] FOR IM NAIL 05MM 26MM XL25, REF [id number], LOT [id number], EXP 06-      Right 1 Implanted   [facility] FOR IM NAIL 05MM,30MM, XL25, REF [id number], LOT [id number], EXP 06-      Right 1 Implanted         Drains: * No LDAs found *    Findings: Comminuted distal right tib fib. See op note.     Electronically signed by Adams Meyers DO on 10/16/2021 at 11:01 AM

## 2021-10-16 NOTE — PROGRESS NOTES
[employer] of Workers Compensation FROI form left with patient and her mother at bedside to complete. Will need to be faxed to Utilization Review at [phone] once the patient's section is completed as instructed. A copy of the form can then be placed in the patient's chart .

## 2021-10-16 NOTE — PROGRESS NOTES
PROGRESS NOTE          PATIENT NAME: [facility] RECORD NO. [id number]  DATE: 10/16/2021  SURGEON: Sophie Hernandez  PRIMARY CARE PHYSICIAN: No primary care provider on file. HD: # 1    ASSESSMENT    Patient Active Problem List   Diagnosis    Trauma       MEDICAL DECISION MAKING AND PLAN    Right distal tib-fib fracture  Ortho consulted, appreciate recommendations  Postop day 0 from ORIF right distal fibula, IMN right distal tibia  Postop Hgb 11.3   DVT Prophylaxis-resume POD#1  Regular diet  PT OT  Tylenol, Neurontin, Robaxin, 5 mg Ermelinda every 4 hours      Chief Complaint: \" Right lower extremity pain\"    [address] [facility] is doing okay. Pain controlled. Was n.p.o. for surgery. Denies any numbness, weakness, tingling. Passing gas. Urinating without difficulty. OBJECTIVE  VITALS: Temp: Temp: 97.6 °F (36.4 °C)Temp  Av.5 °F (36.4 °C)  Min: 96.8 °F (36 °C)  Max: 99.1 °F (74.2 °C) BP Systolic (06VPQ), IXH:595 , Min:88 , NDT:503   Diastolic (70IJE), WJQ:24, Min:58, Max:91   Pulse Pulse  Av.6  Min: 61  Max: 109 Resp Resp  Av.4  Min: 0  Max: 24 Pulse ox SpO2  Av.8 %  Min: 79 %  Max: 100 %  GENERAL: alert, no distress  NEURO: Grossly normal  HEENT: Atraumatic  LUNGS: clear to ausculation, without wheezes, rales or rhonci  HEART: normal rate and regular rhythm  ABDOMEN: soft, non-tender, non-distended, bowel sounds present in all 4 quadrants and no guarding or peritoneal signs present  EXTREMITY: Right lower extremity tenderness, pulses intact    I/O last 3 completed shifts: In: 3330 [P.O.:150; I.V.:1300]  Out: 300 [Urine:250; Blood:50]    Drain/tube output: In: 9711 [P.O.:150;  I.V.:1300]  Out: 50     LAB:  CBC:   Recent Labs     10/15/21  1925 10/16/21  1359   WBC 15.2*  --    HGB 11.7* 11.3*   HCT 36.5 34.9*   MCV 99.2  --      --      BMP:   Recent Labs     10/15/21  1925      K 3.9   *   CO2 19*   BUN 10   CREATININE 0.50   GLUCOSE 115*     COAGS: Recent Labs     10/15/21  1925   APTT 25.3   INR 1.1       RADIOLOGY:  XR TIBIA FIBULA RIGHT (2 VIEWS)   Final Result   Internal fixation of fractures distal tibia and fibula in expected/improved   alignment from prior exam.         FLUORO FOR SURGICAL PROCEDURES   Final Result      XR KNEE RIGHT (3 VIEWS)   Final Result   No fracture or dislocation. CT TIBIA FIBULA RIGHT WO CONTRAST   Final Result   Acute fractures of distal tibia and fibula as above. CT FOOT RIGHT WO CONTRAST   Final Result   Acute fractures in the forefoot as above. XR TIBIA FIBULA RIGHT (2 VIEWS)   Final Result   Post reduction images show improved alignment of the distal tibial and   fibular fractures with subsequent splint placement. XR TIBIA FIBULA RIGHT (2 VIEWS)   Final Result   Post reduction images show improved alignment of the distal tibial and   fibular fractures with subsequent splint placement. XR TIBIA FIBULA RIGHT (2 VIEWS)   Final Result   Post reduction images show improved alignment of the distal tibial and   fibular fractures with subsequent splint placement. XR PELVIS (1-2 VIEWS)   Final Result   No fracture         XR FEMUR RIGHT (MIN 2 VIEWS)   Final Result   No acute osseous abnormality. XR TIBIA FIBULA RIGHT (2 VIEWS)   Final Result   Fractures distal tibia and fibula         XR ANKLE RIGHT (2 VIEWS)   Final Result   Fractures distal tibia and fibula         XR FOOT RIGHT (2 VIEWS)   Final Result   Fracture 1st metatarsal head and proximal phalanx         XR CHEST PORTABLE   Final Result   No acute cardiopulmonary abnormality. Bayron Sanders MD  10/16/21, 6:47 PM            Trauma Attending Attestation      I have reviewed the above GCS note(s) and confirmed the key elements of the medical history and physical exam. I have seen and examined the pt.   I have discussed the findings, established the care plan and recommendations with Resident, GCS RN, bedside nurse.         Lara Andrade DO  10/18/2021  1:08 PM

## 2021-10-16 NOTE — ANESTHESIA POSTPROCEDURE EVALUATION
Department of Anesthesiology  Postprocedure Note    Patient: Lito Samuel  MRN: [id number]  YOB: 2000  Date of evaluation: 10/16/2021  Time:  1:05 PM     Procedure Summary     Date: 10/16/21 Room / Location: [address]    Anesthesia Start: 5532 Anesthesia Stop: 8967    Procedure: TIBIA IM NAIL INSERTION RIGHT,  C-ARM SYNTHES, LEFT FOOT ANKLE CLOSURE (Right ) Diagnosis: (RIGHT TIBIA & FIBULA FRACTURE)    Surgeons: Ricky Washington DO Responsible Provider: Jordan Garvin MD    Anesthesia Type: general ASA Status: 2          Anesthesia Type: general    Chito Phase I: Chito Score: 8    Chito Phase II:      Last vitals: Reviewed and per EMR flowsheets.        Anesthesia Post Evaluation    Patient location during evaluation: PACU  Patient participation: complete - patient participated  Level of consciousness: awake  Pain score: 1  Airway patency: patent  Nausea & Vomiting: no nausea and no vomiting  Complications: no  Cardiovascular status: blood pressure returned to baseline and hemodynamically stable  Respiratory status: acceptable  Hydration status: euvolemic

## 2021-10-16 NOTE — PROGRESS NOTES
[facility])  Occupational Therapy Not Seen Note    DATE: 10/16/2021    NAME: Juliet Carmichael  MRN: [id number]   : 2000      Patient not seen this date for Occupational Therapy due to:    Surgery/Procedure: OR today with ortho for right tibia IMN/ORIF    Next Scheduled Treatment: 10/17/2021    Electronically signed by JOSE Venegas on 10/16/2021 at 7:04 AM

## 2021-10-16 NOTE — PROGRESS NOTES
Speech Language Pathology  [address]  Speech Language Pathology    SPEECH/COGNITIVE ASSESSMENT    NO LOC,CHI OR CVA/TIA - ST TO DEFER AT THIS TIME      Date: 10/16/2021  Patient Name: THE [facility]  YOB: 2000   AGE: 22 y.o. MRN: [id number]        PT NOT SEEN FOR SPEECH OR COGNITIVE ASSESSMENT AT THIS TIME AS NO LOC, CHI OR CVA/TIA IS DOCUMENTED. ST TO DEFER AT THIS TIME. PLEASE RE-COSULT AS NEEDED.       AUDRA Holland  10/16/2021  8:16 AM

## 2021-10-17 VITALS
RESPIRATION RATE: 16 BRPM | HEART RATE: 84 BPM | SYSTOLIC BLOOD PRESSURE: 107 MMHG | HEIGHT: 62 IN | DIASTOLIC BLOOD PRESSURE: 68 MMHG | TEMPERATURE: 98.6 F | WEIGHT: 95 LBS | OXYGEN SATURATION: 98 % | BODY MASS INDEX: 17.48 KG/M2

## 2021-10-17 PROCEDURE — 6370000000 HC RX 637 (ALT 250 FOR IP): Performed by: ORTHOPAEDIC SURGERY

## 2021-10-17 PROCEDURE — 97530 THERAPEUTIC ACTIVITIES: CPT

## 2021-10-17 PROCEDURE — 6370000000 HC RX 637 (ALT 250 FOR IP)

## 2021-10-17 PROCEDURE — 97162 PT EVAL MOD COMPLEX 30 MIN: CPT

## 2021-10-17 RX ORDER — GABAPENTIN 300 MG/1
300 CAPSULE ORAL 3 TIMES DAILY
Qty: 21 CAPSULE | Refills: 0 | Status: SHIPPED | OUTPATIENT
Start: 2021-10-17 | End: 2021-10-26 | Stop reason: SDUPTHER

## 2021-10-17 RX ORDER — OXYCODONE HYDROCHLORIDE 5 MG/1
5 TABLET ORAL EVERY 6 HOURS PRN
Status: DISCONTINUED | OUTPATIENT
Start: 2021-10-17 | End: 2021-10-17 | Stop reason: HOSPADM

## 2021-10-17 RX ORDER — METHOCARBAMOL 750 MG/1
750 TABLET, FILM COATED ORAL 4 TIMES DAILY
Qty: 40 TABLET | Refills: 0 | Status: SHIPPED | OUTPATIENT
Start: 2021-10-17 | End: 2021-10-26 | Stop reason: SDUPTHER

## 2021-10-17 RX ADMIN — GABAPENTIN 300 MG: 300 CAPSULE ORAL at 09:03

## 2021-10-17 RX ADMIN — METHOCARBAMOL TABLETS 750 MG: 500 TABLET, COATED ORAL at 09:03

## 2021-10-17 RX ADMIN — POLYETHYLENE GLYCOL 3350 17 G: 17 POWDER, FOR SOLUTION ORAL at 09:03

## 2021-10-17 RX ADMIN — GABAPENTIN 300 MG: 300 CAPSULE ORAL at 14:42

## 2021-10-17 RX ADMIN — METHOCARBAMOL TABLETS 500 MG: 500 TABLET, COATED ORAL at 12:56

## 2021-10-17 RX ADMIN — OXYCODONE 5 MG: 5 TABLET ORAL at 12:58

## 2021-10-17 RX ADMIN — ACETAMINOPHEN 1000 MG: 500 TABLET ORAL at 05:33

## 2021-10-17 RX ADMIN — OXYCODONE 5 MG: 5 TABLET ORAL at 04:42

## 2021-10-17 RX ADMIN — ACETAMINOPHEN 1000 MG: 500 TABLET ORAL at 13:57

## 2021-10-17 RX ADMIN — OXYCODONE 5 MG: 5 TABLET ORAL at 09:03

## 2021-10-17 RX ADMIN — OXYCODONE 5 MG: 5 TABLET ORAL at 19:25

## 2021-10-17 ASSESSMENT — PAIN SCALES - GENERAL
PAINLEVEL_OUTOF10: 8
PAINLEVEL_OUTOF10: 5
PAINLEVEL_OUTOF10: 9
PAINLEVEL_OUTOF10: 5
PAINLEVEL_OUTOF10: 7
PAINLEVEL_OUTOF10: 9
PAINLEVEL_OUTOF10: 7
PAINLEVEL_OUTOF10: 9
PAINLEVEL_OUTOF10: 7

## 2021-10-17 ASSESSMENT — PAIN DESCRIPTION - ORIENTATION
ORIENTATION: RIGHT
ORIENTATION: RIGHT

## 2021-10-17 ASSESSMENT — PAIN DESCRIPTION - PAIN TYPE: TYPE: ACUTE PAIN;SURGICAL PAIN

## 2021-10-17 ASSESSMENT — PAIN DESCRIPTION - LOCATION
LOCATION: LEG
LOCATION: FOOT

## 2021-10-17 ASSESSMENT — PAIN DESCRIPTION - DIRECTION: RADIATING_TOWARDS: 5TH TOE

## 2021-10-17 NOTE — PROGRESS NOTES
PROGRESS NOTE          PATIENT NAME: [facility] RECORD NO. [id number]  DATE: 10/17/2021  SURGEON: Daniel Meyer  PRIMARY CARE PHYSICIAN: No primary care provider on file. HD: # 2    ASSESSMENT    Patient Active Problem List   Diagnosis    Trauma    Closed fracture of right distal tibia       MEDICAL DECISION MAKING AND PLAN    Right distal tib-fib fracture  Ortho consulted, appreciate recommendations  Postop day 1 from ORIF right distal fibula, IMN right distal tibia  Hgb 11.7 from postop Hgb 11.3  NWB RLE   DVT Prophylaxis-resume POD#1  Regular diet  Continue I-S  PT OT  Tylenol, Neurontin, Robaxin, 5 mg Ermelinda every 6 hours  Dispo: PT OT, dispo planning possibly home if does well      Chief Complaint: \" Right lower extremity pain\"    Mei Sarabia is doing okay. Pain controlled. Tolerating diet. Able to wiggle toes. Urinating without difficulty. Denies any other complaints at this time. Patient was sitting in chair at bedside. Patient has been tolerating Kathya without any difficulty. No shortness of breath, chest pain, abdominal pain, nausea, vomiting. OBJECTIVE  VITALS: Temp: Temp: 98.6 °F (37 °C)Temp  Av.3 °F (36.8 °C)  Min: 98.1 °F (36.7 °C)  Max: 98.6 °F (37 °C) BP Systolic (70GHD), XCH:784 , Min:95 , WSI:705   Diastolic (16MRO), IYO:21, Min:41, Max:68   Pulse Pulse  Av  Min: 68  Max: 84 Resp Resp  Avg: 15  Min: 14  Max: 16 Pulse ox SpO2  Av %  Min: 98 %  Max: 98 %  GENERAL: alert, no distress  NEURO: Grossly normal  HEENT: Atraumatic  LUNGS: clear to ausculation, without wheezes, rales or rhonci  HEART: normal rate and regular rhythm  ABDOMEN: soft, non-tender, non-distended, bowel sounds present in all 4 quadrants and no guarding or peritoneal signs present  EXTREMITY: Right lower extremity tenderness, pulses intact    I/O last 3 completed shifts: In: 3593 [P.O.:150; I.V.:1300]  Out: 1700 [Urine:1650; Blood:50]    Drain/tube output:   In: -   Out: 1382 [Urine:1650]    LAB:  CBC:   Recent Labs     10/15/21  1925 10/16/21  1359   WBC 15.2*  --    HGB 11.7* 11.3*   HCT 36.5 34.9*   MCV 99.2  --      --      BMP:   Recent Labs     10/15/21  1925      K 3.9   *   CO2 19*   BUN 10   CREATININE 0.50   GLUCOSE 115*     COAGS:   Recent Labs     10/15/21  1925   APTT 25.3   INR 1.1       RADIOLOGY:  XR TIBIA FIBULA RIGHT (2 VIEWS)   Final Result   Internal fixation of fractures distal tibia and fibula in expected/improved   alignment from prior exam.         FLUORO FOR SURGICAL PROCEDURES   Final Result      XR KNEE RIGHT (3 VIEWS)   Final Result   No fracture or dislocation. CT TIBIA FIBULA RIGHT WO CONTRAST   Final Result   Acute fractures of distal tibia and fibula as above. CT FOOT RIGHT WO CONTRAST   Final Result   Acute fractures in the forefoot as above. XR TIBIA FIBULA RIGHT (2 VIEWS)   Final Result   Post reduction images show improved alignment of the distal tibial and   fibular fractures with subsequent splint placement. XR TIBIA FIBULA RIGHT (2 VIEWS)   Final Result   Post reduction images show improved alignment of the distal tibial and   fibular fractures with subsequent splint placement. XR TIBIA FIBULA RIGHT (2 VIEWS)   Final Result   Post reduction images show improved alignment of the distal tibial and   fibular fractures with subsequent splint placement. XR PELVIS (1-2 VIEWS)   Final Result   No fracture         XR FEMUR RIGHT (MIN 2 VIEWS)   Final Result   No acute osseous abnormality. XR TIBIA FIBULA RIGHT (2 VIEWS)   Final Result   Fractures distal tibia and fibula         XR ANKLE RIGHT (2 VIEWS)   Final Result   Fractures distal tibia and fibula         XR FOOT RIGHT (2 VIEWS)   Final Result   Fracture 1st metatarsal head and proximal phalanx         XR CHEST PORTABLE   Final Result   No acute cardiopulmonary abnormality.                Yfn Lewis MD  10/17/21, 12:51 PM Trauma Attending Attestation      I have reviewed the above GCS note(s) and confirmed the key elements of the medical history and physical exam. I have seen and examined the pt. I have discussed the findings, established the care plan and recommendations with Resident, GCS RN, bedside nurse.   PT/OT   Pain control       Laly Pearson DO  10/18/2021  12:51 PM

## 2021-10-17 NOTE — PROGRESS NOTES
Patient requesting to be discharged. Trauma resident, Dr. Stalin Sanchez,  notified of above and advises writer she is in a trauma and will address this as soon as possible.

## 2021-10-17 NOTE — PROGRESS NOTES
DME order: Lee [facility]    Face to face evaluation of pt performed and is medically necessary for pt to have a walker due to decreased functional mobility, decreased coordination, and decreased strength s/p R distal tibia/fibula fracture.      Sol Le MD  PGY1        Electronically signed by Yash Sanchez DO on 10/18/2021 at 1:46 PM

## 2021-10-17 NOTE — OP NOTE
[address]                  [facility]                                OPERATIVE REPORT    PATIENT NAME: Nabor Scott                    :        2000  MED REC NO:   [id number]                             ROOM:       2403  ACCOUNT NO:   [de-identified]                           ADMIT DATE: 10/15/2021  PROVIDER:     Frantz Grant    DATE OF PROCEDURE:  10/16/2021    PREOPERATIVE DIAGNOSES:  1. Right distal one third tibial shaft fracture. 2.  Right distal fibular fracture. 3.  Right first toe proximal phalanx fracture and first metatarsal head  fracture. POSTOPERATIVE DIAGNOSES:  1. Right distal one third tibial shaft fracture. 2.  Right distal fibular fracture. 3.  Right first toe proximal phalanx fracture and first metatarsal head  fracture. PROCEDURES:  1. Open treatment of right distal one third tibial shaft fracture with  intramedullary nail, CPT [id number].  2.  Open reduction and internal fixation of right distal fibula  fracture, CPT 79360.  3.  Stress examination under anesthesia of the right midfoot as well as  the first metatarsophalangeal joint, CPT 78185. ATTENDING SURGEON:  Frantz Grant DO    ASSISTANTS:  1. Deanne Dickerson DO, PGY-5  2. Huong Barrera DO, PGY-3    ANESTHESIA:  General.    ESTIMATED BLOOD LOSS:  50 mL. IV FLUIDS:  1200 mL of crystalloid. TOURNIQUET TIME:  53 minutes. COMPLICATIONS:  None. SPECIMEN:  None. IMPLANTS:  1. Synthes 9 x 300 mm Tibial Nail Advanced. 2.  Synthes 7-hole 2.7-mm LCP plate. INDICATIONS:  The patient is a 73-year-old female who sustained a crush  injury to the right lower extremity after being hit with a forklift. She was transferred from an outside facility due to concern for  potential vascular injury. Upon arrival to our emergency department,  there was no evidence of open wounds or neurovascular injury.   However,  there was a highly comminuted distal one third tibial shaft fracture as  well as an associated distal fibular fracture. There was noted to be a  fracture of the first toe that involved the proximal phalanx and first  metatarsal.  Based on these findings as well as the mechanism of injury,  there was suspicion for potential occult Lisfranc injury. Therefore, we  discussed with the patient and her family since she was a minor,  surgical intervention in the form of intramedullary nail fixation of the  tibia and possible ORIF of the fibula and stress examination of the  midfoot and first MTP joint with potential open versus percutaneous  intervention if necessary. They stated that all questions were  addressed to their satisfaction, agreed to proceed and provided written  informed consent. The operative site was marked. OPERATIVE PROCEDURE:  The patient was transported to the operating room. General anesthesia was administered by the anesthesia providers without  complication. She was transferred to a cantilever-type OR table in a  supine position. All bony prominences were well-padded. A tourniquet  was placed in the right lower extremity. The extremity was then  isolated, scrubbed with Hibiclens followed by alcohol and prepped and  draped in the usual sterile fashion. A time-out was performed that  included all involved parties in correctly identifying the patient,  planned procedure and operative site. After everyone agreed, we  continued. We created a lateral parapatellar extraocular approach to  the proximal tibia. The wire sleeve and guidewire were inserted. Once  the appropriate start point trajectory was obtained, the wire was  advanced into the intramedullary canal and found to be in appropriate  position on the AP and lateral radiographs. We then used the cannulated  opening drill. Everything was then removed and exchanged for a ball-tip  guidewire, which was advanced up to the fracture site.   The fracture was  still significantly displaced and based on the comminution, there was  not an area that would provide an adequate read for the reduction. Therefore, we felt that fixation of the fibula would help restore the  length, alignment, and rotation to a significant degree. Therefore, we  made a posterolateral approach over the fracture at the distal fibula in  a standard fashion. The fracture was able to be directly manipulated  and reduced. A Synthes 2.7-mm, 7-hole LCP plate was then applied to the  lateral aspect of the tibia. It was secured both proximally and  distally with two cortical screws on each side of the fracture. This  was noted to have provided significantly improved reduction of the tibia  fracture, and the final manipulation of the distal tibia was able to be  performed with a shoulder hook through the lateral incision. The wire  was advanced across the fracture and impacted into the subchondral bone  of the distal tibia. Once the appropriate location was confirmed, we  then obtained the measurement and reamed up to a 10-mm reamer, which was  extremely tight and therefore, we elected to use a 9 x 300 mm Synthes  Tibial Nail Advanced. Once the nail was fully inserted and  appropriately rotated, it was secured with four distal interlocking  bolts inserted through percutaneous incisions with the exception of the  anterior and anteromedial.  For this, dissection was carried down. The  contents of the anterior compartment were bluntly dissected and  retracted until the bone surface was directly visualized. The screws  were then inserted in a standard fashion after this and after assuring  that the neurovascular bundle was protected throughout the entirety. We  then elected to place two proximal interlocking screws using the aiming  arm and trocars. With the fixation complete, the insertion handle was  removed.   Final AP and lateral radiographs of the entire tibia and  fibula were obtained which showed acceptable reduction and safe and  appropriate implant placement in all views. We then turned our  attention to the foot. We performed a stress examination under live  anesthesia of the midfoot and found no instability or evidence of a  Lisfranc type injury. There was somewhat increased laxity of the MTP  joint; however, the joint did not dislocate and in a resting position  was anatomically lined on both the AP and lateral radiographs without  significant displacement of the fracture fragments. Therefore, we felt  that no additional stabilization was necessary in this case. We  copiously irrigated all wounds with normal saline solution. We closed  the soft tissues in a standard layered fashion with absorbable suture  where necessary, and the skin was approximated with 3-0 nylon in a  vertical mattress pattern at the incision sites. Leg was cleaned and  dried. Because of the degree of comminution and the distal nature of  the tibia fracture combined with the foot injuries, we elected to place  this patient in a short-leg splint after the skin was cleaned and  sterile bandages had been applied. The patient was successfully  extubated and transferred to the Recovery by the anesthesia providers. POSTOPERATIVE PLAN:  We will obtain digital x-rays of the leg in the  recovery room. The patient will be nonweightbearing for the first 2  weeks after this procedure. Based on her comfort level at her 2-week  followup, she will remain an additional 4 weeks of toe-touch  weightbearing or be allowed to progress slowly to full weightbearing  over the following 4 weeks. She will receive 23 hours of prophylactic  antibiotics and may begin DVT prophylaxis on postoperative day #1 if  felt to be appropriate by the Primary Service. Following discharge,  again she will be evaluated in 2 weeks in the orthopedic clinic and then  anticipate suture removal at that time.         MIRTHA RUSSELL    D: 10/16/2021 14:26:43       T: 10/16/2021 14:31:42     [id number]  Job#: [id number]     Doc#: [id number]    CC:

## 2021-10-17 NOTE — CARE COORDINATION
Transitional planning:  Called to unit by RN, pt has order for walker. Spoke with pt and few times after calling [facility]. [facility] does not cover w.comp. equipment. Encouraged to go to [employer]. Pts friend at bedside, states they have a walker for her to borrow. Gave Pt Rx for walker. Encouraged to give to w[employer]comp for coverage. Notified that there is no DC order yet. Pt very anxious to \"sleep in her own bed, tonight. \"

## 2021-10-17 NOTE — PROGRESS NOTES
Progress Note    Patient:  Yumiko Tang  YOB: 2000     22 y.o. female    Subjective:  Patient seen and examined at bedside this morning. No new complaints or concerns per patient this morning other than pain control. Describes pain in right lower extremity as \"sharp\" and has a \"pins and needles\" sensation radiating down the lateral aspect of lower leg into the plantar aspect of the foot. No acute issues overnight per nursing. Noting some difficulty with urination, both incontinence as well as needing to strain to urinate. Denies: fever/chills, HA, CP, SOB, N/V. No BM/ is having flatus. PT: has not yet evaluated patient . Objective:   Vitals:    10/17/21 0430   BP: 110/61   Pulse:    Resp: 14   Temp: 98.4 °F (36.9 °C)   SpO2:      Gen: NAD, cooperative   Cardiovascular: Regular rate  Respiratory: no audible wheezing, symmetrical chest expansion   MSK: Right lower extremity: Splint on, which is clean/dry/intact. Overlying dressings around knee clean/dry/intact. Tenderness to palpation along calf. Compartments soft and compressible. EHL/FHL gross motor intact. Sensation intact to exposed digits. DP pulses 2+ with BCR; foot warm and perfused. Recent Labs     10/15/21  1925 10/15/21  1925 10/16/21  1359   WBC 15.2*  --   --    HGB 11.7*   < > 11.3*   HCT 36.5   < > 34.9*     --   --    INR 1.1  --   --      --   --    K 3.9  --   --    BUN 10  --   --    CREATININE 0.50  --   --    GLUCOSE 115*  --   --     < > = values in this interval not displayed. Meds: See rec for complete list    Impression: 22 y.o. female being seen and evaluated after sustaining a right distal tibia and right distal fibula fracture, s/p IMN of distal tibia and ORIF of right distal fibula, as well as EUA of 1st MTJ, POD#1       Plan:     -Maintain dressings. Notify ortho if saturated.    -Non-weightbearing to right lower extremity   -Post-op Hb 11.3 yesterday   -Pain control: per primary team discretion   -Tolerating PO intake well  -Some difficulty with urination. Encouraged patient to continue to monitor and inform nurse/primary team if she continues to notice issues with needing to strain to urinate or episodes of incontinence. -Ice (20 min, 1 hour off) and elevation for edema/pain control  -Encourage deep breathing and IS  -DVT ppx: EPC.  OK for chemical anticoagulation from an orthopedic standpoint  -PT/OT to evaluate and treat   -Plan will be to follow up with Dr. Alexander Knapp 10-14 days after surgery   -Please page Ortho with any questions or concerns    Gela Mcgill DO  PGY-2 Orthopedic Surgery  6:37 AM 10/17/2021

## 2021-10-17 NOTE — PROGRESS NOTES
Physical Therapy    Facility/Department: [address] ORTHO/MED SURG  Initial Assessment    NAME: Jessi Wilson  : 2000  MRN: [id number]  Post-Op Diagnosis:   1. Right distal tibia fracture  2. Right distal fibula fracture  3. Right 1st P1 hallux fracture and 1st MT head avulsion       Procedure(s):  1. Open treatment with intramedullary nail right distal tibia, CPT 37356  2. Open reduction internal fixation right distal fibula, CPT 38034  3. Exam under anesthesia of the right lisfranc joint and 1st MTJ; 23910     Date of Service: 10/17/2021    Discharge Recommendations:  Patient would benefit from continued therapy after discharge   PT Equipment Recommendations  Equipment Needed: Yes  Mobility Devices: Walker;Crutches  Walker: Rolling  Crutches: Axillary  Other: Patient requests crutches. For first time out of bed, walker was used for the additional stability. Clarified recommendations will be provided. Assessment   Body structures, Functions, Activity limitations: Decreased functional mobility ; Increased pain;Decreased strength;Decreased endurance;Decreased coordination  Assessment: Patient shuddering and weeping with pain, c/o all in 5th toe and along lateral edge of right foot. This reported to nurse and to ortho residents who assessed her foot. Besides the pain, patient was able to sit up and pivot to chair, 100% compliant with NWB RLE, min A only. Patient will need further PT to regain functional independence.   Prognosis: Good  Decision Making: Medium Complexity  Clinical Presentation: evolving  PT Education: Goals;Weight-bearing Education;PT Role;General Safety;Plan of Care;Precautions;Transfer Training;Functional Mobility Training;Equipment;Disease Specific Education  REQUIRES PT FOLLOW UP: Yes  Activity Tolerance  Activity Tolerance: Patient limited by pain       Patient Diagnosis(es): The primary encounter diagnosis was Closed fracture of distal end of right tibia, unspecified fracture morphology, initial encounter. A diagnosis of Trauma was also pertinent to this visit. has a past medical history of COPD (chronic obstructive pulmonary disease) ([address] has a past surgical history that includes Tonsillectomy and Tibia fracture surgery (Right, 10/16/2021). Restrictions  Restrictions/Precautions  Restrictions/Precautions: Weight Bearing  Lower Extremity Weight Bearing Restrictions  Right Lower Extremity Weight Bearing: Non Weight Bearing  Position Activity Restriction  Other position/activity restrictions: s/p IMN of distal tibia and ORIF of right distal fibula, as well as EUA of 1st MTJ  Vision/Hearing  Vision: Within Functional Limits  Hearing: Within functional limits     Subjective  General  Chart Reviewed: Yes  Patient assessed for rehabilitation services?: Yes  Family / Caregiver Present: No  Follows Commands: Within Functional Limits  Pain Screening  Patient Currently in Pain: Yes  Pain Assessment  Pain Assessment: 0-10  Pain Level: 9  Pain Location: Foot  Pain Orientation: Right  Pain Radiating Towards: 5th toe  Vital Signs  Pulse: 68  Patient Currently in Pain: Yes       Orientation  Orientation  Overall Orientation Status: Within Functional Limits  Social/Functional History  Social/Functional History  Lives With: Other (comment) (Goes back and forth between her mother and father. Also sometimes stays with her boyfriend. Boyfriend's home is the most accessible with the fewest steps.)  Type of Home: House  Home Layout: One level  Home Access: Stairs to enter with rails  Entrance Stairs - Number of Steps: 2 at boyfriend's house  ADL Assistance: [address]: Independent  Homemaking Responsibilities: Yes  Ambulation Assistance: Independent  Transfer Assistance: Independent  Active : Yes  Additional Comments: Loads pallets at work  [employer]  Overall Cognitive Status: WFL    Objective     Observation/Palpation  Observation: 5'2''. Thin frame at 95 lbs.   The 5th toe is observable poking out of the foot splint and the same pinkish color as other toes. Strength RLE  Comment: No ankle motion was attempted. The knee was flexed at the edge of the bed from 10 degrees flexion to 55 degrees flexion. With that amount of flexion, she held the foot suspended. Strength LLE  Strength LLE: WFL     Sensation  Overall Sensation Status: Impaired (Numb and tingling 5th toe along lateral edge of foot.)  Bed mobility  Supine to Sit: Minimal assistance  Transfers  Sit to Stand: Minimal Assistance  Stand to sit: Minimal Assistance  Bed to Chair: Minimal assistance        Balance  Sitting - Static: Fair  Sitting - Dynamic: Fair  Standing - Static: Fair;-  Standing - Dynamic: Poor;+        Plan   Plan  Times per week: 5-6x/wk  Current Treatment Recommendations: Gait Training, Stair training, Pain Management, Functional Mobility Training, Endurance Training, Home Exercise Program, Transfer Training, Safety Education & Training, Patient/Caregiver Education & Training, Equipment Evaluation, Education, & procurement, ROM  Safety Devices  Type of devices: All fall risk precautions in place, Left in chair, Call light within reach, Gait belt, Nurse notified    AM-PAC Score  -PAC Inpatient Mobility Raw Score : 14 (10/17/21 1008)  AM-PAC Inpatient T-Scale Score : 38.1 (10/17/21 1008)  Mobility Inpatient CMS 0-100% Score: 61.29 (10/17/21 1008)  Mobility Inpatient CMS G-Code Modifier : CL (10/17/21 1008)          Goals  Short term goals  Time Frame for Short term goals: 14 visits  Short term goal 1: Supine to/from sit with SBA. Short term goal 2: Sit to/from stand with SBA. Short term goal 3: Ambulate 22' NWB RLE with wheeled walker CGA. Short term goal 4: Negotiate up and down 2 steps with NWB RLE and walker CGA.        Therapy Time   Individual Concurrent Group Co-treatment   Time In 0925         Time Out 0952         Minutes 27         Timed Code Treatment Minutes: 23 Minutes       Martha Raven Cardenas

## 2021-10-18 NOTE — DISCHARGE SUMMARY
DISCHARGE SUMMARY:    PATIENT NAME:  Marcelo Bo  YOB: 2000  MEDICAL RECORD NO. [id number]  DATE: 10/18/21  PRIMARY CARE PHYSICIAN: No primary care provider on file. ADMIT DATE:  10/15/2021    DISCHARGE DATE:  10/17/2021  DISPOSITION:  Home  ADMITTING DIAGNOSIS:   Fall, initial encounter    DIAGNOSIS:   Patient Active Problem List   Diagnosis    Trauma    Closed fracture of right distal tibia       CONSULTANTS: Orthopedic surgery    PROCEDURES: ORIF right distal fibula, IMN right distal tibia      HOSPITAL COURSE:   Marcelo Bo is a 22 y.o. female who was admitted on 10/15/2021  Hospital Course:  25-year-old female seen in the ED and admitted status post forklift fell on right lower extremity sustaining right tib-fib fracture. Orthopedic surgery consulted and patient had ORIF right distal fibula, I am on right distal tibia. Patient instructed to be nonweightbearing right lower extremity x2 weeks status post surgery. Hemoglobin stable. Worked with PT OT and patient discharged home with instruction to follow-up outpatient with orthopedic surgery. Labs and imaging were followed daily. On day of discharge Marcelo Bo  was tolerating a regular diet  had adequate analgeia on oral medications  had no signs of complication. She was deemed medically stable for discharged to Home        PHYSICAL EXAMINATION:        Discharge Vitals:  height is 5' 2\" (1.575 m) and weight is 95 lb (43.1 kg). Her oral temperature is 98.6 °F (37 °C). Her blood pressure is 107/68 and her pulse is 84. Her respiration is 16 and oxygen saturation is 98%.    Exam on day of discharge:  GENERAL: alert, no distress  NEURO: Grossly normal  HEENT: Atraumatic  LUNGS: clear to ausculation, without wheezes, rales or rhonci  HEART: normal rate and regular rhythm  ABDOMEN: soft, non-tender, non-distended, bowel sounds present in all 4 quadrants and no guarding or peritoneal signs present  EXTREMITY: Right lower extremity tenderness, pulses intact    LABS:     Recent Labs     10/15/21  1925 10/16/21  1359   WBC 15.2*  --    HGB 11.7* 11.3*   HCT 36.5 34.9*     --      --    K 3.9  --    *  --    CO2 19*  --    BUN 10  --    CREATININE 0.50  --        DIAGNOSTIC TESTS:    XR PELVIS (1-2 VIEWS)    Result Date: 10/15/2021  EXAMINATION: ONE XRAY VIEW OF THE PELVIS 10/15/2021 7:58 pm COMPARISON: None. HISTORY: ORDERING SYSTEM PROVIDED HISTORY: trauma TECHNOLOGIST PROVIDED HISTORY: trauma FINDINGS: Pelvic ring is intact. Lumbar spine appears normal.  Hips are in anatomic alignment. Sacrum normal.     No fracture     XR FEMUR RIGHT (MIN 2 VIEWS)    Result Date: 10/15/2021  EXAMINATION: 2 XRAY VIEWS OF THE RIGHT FEMUR 10/15/2021 7:58 pm COMPARISON: None. HISTORY: ORDERING SYSTEM PROVIDED HISTORY: trauma TECHNOLOGIST PROVIDED HISTORY: trauma FINDINGS: There is no evidence of acute fracture. There is normal alignment. No acute joint abnormality. No focal osseous lesion. No focal soft tissue abnormality. No acute osseous abnormality. XR KNEE RIGHT (3 VIEWS)    Result Date: 10/15/2021  EXAMINATION: THREE XRAY VIEWS OF THE RIGHT KNEE 10/15/2021 11:22 pm COMPARISON: None. HISTORY: ORDERING SYSTEM PROVIDED HISTORY: trauma TECHNOLOGIST PROVIDED HISTORY: trauma Reason for Exam: Trauma Acuity: Acute Type of Exam: Initial FINDINGS: No fracture, dislocation, or focal osseous lesion is noted. No significant soft tissue abnormality seen. No fracture or dislocation. XR TIBIA FIBULA RIGHT (2 VIEWS)    Result Date: 10/16/2021  EXAMINATION: XRAY VIEWS OF THE RIGHT TIBIA AND FIBULA 10/16/2021 11:42 am COMPARISON: October 15 21 HISTORY: ORDERING SYSTEM PROVIDED HISTORY: post op in pacu TECHNOLOGIST PROVIDED HISTORY: post op in pacu FINDINGS: There is internal fixation with intramedullary christofer associated screw supporting fracture of the distal shaft of the tibia. Plate screw fixation of the distal fibular fracture noted. Alignment is within normal limits. There is overlying splint cast.     Internal fixation of fractures distal tibia and fibula in expected/improved alignment from prior exam.     XR TIBIA FIBULA RIGHT (2 VIEWS)    Result Date: 10/15/2021  EXAMINATION: 1 XRAY VIEWS OF THE RIGHT TIBIA AND FIBULA (x2), 2 images of the right tibia and fibula 10/15/2021 9:54 pm COMPARISON: 10/15/2021 HISTORY: ORDERING SYSTEM PROVIDED HISTORY: Post-splint TECHNOLOGIST PROVIDED HISTORY: Post-splint Acuity: Acute Type of Exam: Ongoing Post reduction x2 and post splint FINDINGS: Sequential post reduction images of the right tibia and fibula show improved alignment of the comminuted distal tibial diametaphyseal fracture and fibular fracture at the same level. The ankle and foot are obscured by hands performing the reduction. There is persistent slight displacement of the major fracture fragments. Subsequent images with a splint show similar similar alignment of the fractures now immobilized in a splint. CT may be useful for further evaluation. Post reduction images show improved alignment of the distal tibial and fibular fractures with subsequent splint placement. XR TIBIA FIBULA RIGHT (2 VIEWS)    Result Date: 10/15/2021  EXAMINATION: 1 XRAY VIEWS OF THE RIGHT TIBIA AND FIBULA (x2), 2 images of the right tibia and fibula 10/15/2021 9:54 pm COMPARISON: 10/15/2021 HISTORY: ORDERING SYSTEM PROVIDED HISTORY: Post-splint TECHNOLOGIST PROVIDED HISTORY: Post-splint Acuity: Acute Type of Exam: Ongoing Post reduction x2 and post splint FINDINGS: Sequential post reduction images of the right tibia and fibula show improved alignment of the comminuted distal tibial diametaphyseal fracture and fibular fracture at the same level. The ankle and foot are obscured by hands performing the reduction. There is persistent slight displacement of the major fracture fragments.   Subsequent images with a splint show similar similar alignment of the fractures now immobilized in a splint. CT may be useful for further evaluation. Post reduction images show improved alignment of the distal tibial and fibular fractures with subsequent splint placement. XR TIBIA FIBULA RIGHT (2 VIEWS)    Result Date: 10/15/2021  EXAMINATION: 1 XRAY VIEWS OF THE RIGHT TIBIA AND FIBULA (x2), 2 images of the right tibia and fibula 10/15/2021 9:54 pm COMPARISON: 10/15/2021 HISTORY: ORDERING SYSTEM PROVIDED HISTORY: Post-splint TECHNOLOGIST PROVIDED HISTORY: Post-splint Acuity: Acute Type of Exam: Ongoing Post reduction x2 and post splint FINDINGS: Sequential post reduction images of the right tibia and fibula show improved alignment of the comminuted distal tibial diametaphyseal fracture and fibular fracture at the same level. The ankle and foot are obscured by hands performing the reduction. There is persistent slight displacement of the major fracture fragments. Subsequent images with a splint show similar similar alignment of the fractures now immobilized in a splint. CT may be useful for further evaluation. Post reduction images show improved alignment of the distal tibial and fibular fractures with subsequent splint placement. XR TIBIA FIBULA RIGHT (2 VIEWS)    Result Date: 10/15/2021  EXAMINATION: 2 XRAY VIEWS OF THE RIGHT TIBIA AND FIBULA 10/15/2021 7:58 pm COMPARISON: None. HISTORY: ORDERING SYSTEM PROVIDED HISTORY: trauma TECHNOLOGIST PROVIDED HISTORY: trauma FINDINGS: Comminuted displaced fractures distal tibia and fibula obscured by a fiberglass splint. Knee and ankle in gross anatomic alignment. Fractures distal tibia and fibula     XR ANKLE RIGHT (2 VIEWS)    Result Date: 10/15/2021  EXAMINATION: 2 XRAY VIEWS OF THE RIGHT ANKLE 10/15/2021 7:58 pm COMPARISON: None. HISTORY: ORDERING SYSTEM PROVIDED HISTORY: trauma TECHNOLOGIST PROVIDED HISTORY: trauma FINDINGS: Comminuted fractures distal tibia and fibula with 100% lateral displacement.  Detail obscured by a fiberglass splint. Fractures distal tibia and fibula     XR FOOT RIGHT (2 VIEWS)    Result Date: 10/15/2021  EXAMINATION: TWO XRAY VIEWS OF THE RIGHT FOOT 10/15/2021 7:58 pm COMPARISON: None. HISTORY: ORDERING SYSTEM PROVIDED HISTORY: trauma TECHNOLOGIST PROVIDED HISTORY: trauma FINDINGS: Intra-articular comminuted minimally displaced fracture base of the 1st proximal phalanx and 1st metatarsal head. Soft tissue swelling. Fracture 1st metatarsal head and proximal phalanx     XR CHEST PORTABLE    Result Date: 10/15/2021  EXAMINATION: ONE XRAY VIEW OF THE CHEST 10/15/2021 7:57 pm COMPARISON: None. HISTORY: ORDERING SYSTEM PROVIDED HISTORY: trauma TECHNOLOGIST PROVIDED HISTORY: trauma FINDINGS: The lungs are clear. The cardiomediastinal contour is unremarkable. No pneumothorax or pleural effusion is seen. The visualized bones are grossly intact. No acute cardiopulmonary abnormality. CT TIBIA FIBULA RIGHT WO CONTRAST    Result Date: 10/16/2021  EXAMINATION: CT OF THE RIGHT TIBIA AND FIBULA WITHOUT CONTRAST 10/15/2021 10:20 pm TECHNIQUE: CT of the right tibia and fibula was performed without the administration of intravenous contrast.  Multiplanar reformatted images are provided for review. Dose modulation, iterative reconstruction, and/or weight based adjustment of the mA/kV was utilized to reduce the radiation dose to as low as reasonably achievable. COMPARISON: None. HISTORY ORDERING SYSTEM PROVIDED HISTORY: pre op planning TECHNOLOGIST PROVIDED HISTORY: pre op planning Is the patient pregnant?->No Reason for Exam: pre op FINDINGS: Bones: Acute comminuted nondisplaced fracture of distal tibial shaft is noted. A nondisplaced sagittal oblique fracture line extends inferiorly to the lateral aspect of the tibial plafond at the level of the ankle joint.   In addition, a minimally displaced fracture of the distal fibular shaft is noted with the distal fracture fragment displaced medially for approximately 0.4 cm. Soft Tissue: No radiopaque foreign body is seen. There is no soft tissue gas. Joint: No significant ankle or subtalar joint effusion is noted. Acute fractures of distal tibia and fibula as above. CT FOOT RIGHT WO CONTRAST    Result Date: 10/16/2021  EXAMINATION: CT OF THE RIGHT FOOT WITHOUT CONTRAST 10/15/2021 10:20 pm TECHNIQUE: CT of the right foot was performed without the administration of intravenous contrast.  Multiplanar reformatted images are provided for review. Dose modulation, iterative reconstruction, and/or weight based adjustment of the mA/kV was utilized to reduce the radiation dose to as low as reasonably achievable. COMPARISON: None. HISTORY ORDERING SYSTEM PROVIDED HISTORY: pre op planning TECHNOLOGIST PROVIDED HISTORY: Include ankle. Dr. Jace Wolf protocol pre op planning Is the patient pregnant?->No Reason for Exam: pre op dr roman protocol FINDINGS: Bones: Acute comminuted intra-articular fracture of the base and proximal shaft of the 1st proximal phalanx is seen. There is acute nondisplaced fracture of the medial aspect of 1st metatarsal head. Also, acute nondisplaced fracture of the dorsal aspect of the 2nd metatarsal head is noted. Distal tibial and fibular fractures are redemonstrated Soft Tissue: Diffuse soft tissue swelling on the dorsum of the foot is noted, compatible with hematoma. There is no radiopaque foreign body. Acute fractures in the forefoot as above. FLUORO FOR SURGICAL PROCEDURES    Result Date: 10/16/2021  Radiology exam is complete. No Radiologist dictation. Please follow up with ordering provider. DISCHARGE INSTRUCTIONS     Discharge Medications:        Medication List      START taking these medications    gabapentin 300 MG capsule  Commonly known as: NEURONTIN  Take 1 capsule by mouth 3 times daily for 7 days.      methocarbamol 750 MG tablet  Commonly known as: ROBAXIN  Take 1 tablet by mouth 4 times daily for 10 days CONTINUE taking these medications    albuterol (5 MG/ML) 0.5% nebulizer solution  Commonly known as: PROVENTIL     Nexplanon 68 MG implant  Generic drug: etonogestrel           Where to Get Your Medications      You can get these medications from any pharmacy    Bring a paper prescription for each of these medications  gabapentin 300 MG capsule  methocarbamol 750 MG tablet       Diet: No diet orders on file diet as tolerated  Activity: As instructed WEIGHT BEARING STATUS: Non-weight bearing  Wound Care: Daily and as needed.     DISPOSITION: Home    Follow-up:  Helena Roberson, [address] [address]  9[facility]              SIGNED:  Kalli Hilliard MD   10/18/2021, 1:34 PM  Time Spent for discharge: 35 minutes

## 2021-10-20 ENCOUNTER — TELEPHONE (OUTPATIENT)
Dept: ORTHOPEDIC SURGERY | Age: 21
End: 2021-10-20

## 2021-10-20 NOTE — TELEPHONE ENCOUNTER
Pt called in with some concerns pt had surgery with dr Mili Khan on 10/16/21 TIBIA IM NAIL INSERTION RIGHT,  C-ARM SYNTHES, LEFT FOOT ANKLE CLOSURE      Pt is concerned about her mensrual cycle pt states her cycle should have ended by now and has not

## 2021-10-20 NOTE — TELEPHONE ENCOUNTER
Called patient. No Ortho concerns at the present. Only concern is that she started her period earlier than expected. Advised to contact her OBGYN with any further concerns in regards to her menses. Patient states understanding.

## 2021-10-26 ENCOUNTER — OFFICE VISIT (OUTPATIENT)
Dept: ORTHOPEDIC SURGERY | Age: 21
End: 2021-10-26

## 2021-10-26 DIAGNOSIS — S82.391D OTHER CLOSED FRACTURE OF DISTAL END OF RIGHT TIBIA WITH ROUTINE HEALING, SUBSEQUENT ENCOUNTER: Primary | ICD-10-CM

## 2021-10-26 PROCEDURE — 99024 POSTOP FOLLOW-UP VISIT: CPT | Performed by: ORTHOPAEDIC SURGERY

## 2021-10-26 RX ORDER — ACETAMINOPHEN 500 MG
1000 TABLET ORAL EVERY 8 HOURS
Qty: 120 TABLET | Refills: 3 | Status: SHIPPED | OUTPATIENT
Start: 2021-10-26

## 2021-10-26 RX ORDER — METHOCARBAMOL 750 MG/1
750 TABLET, FILM COATED ORAL 4 TIMES DAILY
Qty: 40 TABLET | Refills: 0 | Status: SHIPPED | OUTPATIENT
Start: 2021-10-26 | End: 2021-11-05

## 2021-10-26 RX ORDER — GABAPENTIN 300 MG/1
300 CAPSULE ORAL 3 TIMES DAILY
Qty: 21 CAPSULE | Refills: 0 | Status: SHIPPED | OUTPATIENT
Start: 2021-10-26 | End: 2021-11-02

## 2021-10-26 RX ORDER — OXYCODONE HYDROCHLORIDE 5 MG/1
5 TABLET ORAL EVERY 6 HOURS PRN
Qty: 28 TABLET | Refills: 0 | Status: SHIPPED | OUTPATIENT
Start: 2021-10-26 | End: 2021-11-02

## 2021-10-26 NOTE — LETTER
[facility]  [address] [facility] [address]  Phone: [phone]  Fax: [phone]    Mariah Harrington DO        October 26, 2021     Patient: Amandeep Gamez   YOB: 2000   Date of Visit: 10/26/2021       To Whom it May Concern:    Rosalva Onofre was seen in my clinic on 10/26/2021. She was accompanied by her father Deena Maria for her appointment today. He should be excused from work for the appointment. If you have any questions or concerns, please don't hesitate to call.     Sincerely,       Mariah Harrington DO

## 2021-10-27 ENCOUNTER — TELEPHONE (OUTPATIENT)
Dept: ORTHOPEDIC SURGERY | Age: 21
End: 2021-10-27

## 2021-10-27 NOTE — TELEPHONE ENCOUNTER
[facility] needing completion from last office visit with Dr. Jelena Mendez, please fax to [phone], phone for questions is [phone]

## 2021-10-28 NOTE — PROGRESS NOTES
tablet     Sig: Take 2 tablets by mouth every 8 hours     Dispense:  120 tablet     Refill:  3          Orders Placed This Encounter   Procedures    External Referral To Physical Therapy     Referral Priority:   Routine     Referral Type:   Eval and Treat     Referral Reason:   Specialty Services Required     Requested Specialty:   Physical Therapy     Number of Visits Requested:   1       Electronically signed by Pawan Rivero DO, DO on 10/28/2021 at 8:34 AM    This note is created with the assistance of a speech recognition program.  While intending to generate a document that actually reflects the content of the visit, the document can still have some errors including those of syntax and sound a like substitutions which may escape proof reading.   In such instances, actual meaning can be extrapolated by contextual diversion

## 2021-12-07 ENCOUNTER — OFFICE VISIT (OUTPATIENT)
Dept: ORTHOPEDIC SURGERY | Age: 21
End: 2021-12-07

## 2021-12-07 VITALS — HEIGHT: 62 IN | WEIGHT: 95 LBS | BODY MASS INDEX: 17.48 KG/M2

## 2021-12-07 DIAGNOSIS — S82.391D OTHER CLOSED FRACTURE OF DISTAL END OF RIGHT TIBIA WITH ROUTINE HEALING, SUBSEQUENT ENCOUNTER: Primary | ICD-10-CM

## 2021-12-07 PROCEDURE — 99024 POSTOP FOLLOW-UP VISIT: CPT | Performed by: ORTHOPAEDIC SURGERY

## 2021-12-07 NOTE — PROGRESS NOTES
[facility]   [facility]  [facility]  [address] [address]  Dept: [phone]  Dept Fax: [phone]        [facility] Follow Up      Subjective:   Date of Surgery: 10/16/2021    Hemant Sher is a 22y.o. year old female who presents to the clinic today for routine follow up right distal third tibial shaft fracture status post intramedullary nailing, right distal fibula fracture status post open reduction internal fixation and right great toe proximal phalanx fracture with nonoperative management. Patient's previous visit she states that she has been doing well. She notes that she has been weightbearing as tolerated in the walking boot. She notes minimal pain over the fracture site of the distal tibia. She does note some tenderness over the lateral aspect of the fibula. Patient also notes a wound over the dorsal aspect of her foot for which she states that she has been applying antibiotic ointment and covering with a Tegaderm type dressing. Patient also notes that she has been unable to extend her great toe. She also reports some dysesthesias over the lateral aspect of the ankle extending into the dorsum of the foot. Patient denies any new falls or injuries. Review of Systems  Gen: no fever, chills, malaise  CV: no chest pain or palpitations  Resp: no cough or shortness of breath  GI: no nausea, vomiting, diarrhea, or constipation  Neuro: no seizures, vertigo, or headache  Msk: Right leg pain  10 remaining systems reviewed and negative    Objective : There were no vitals filed for this visit. There is no height or weight on file to calculate BMI. General: No acute distress, resting comfortably in the clinic  Neuro: alert. oriented  Eyes: Extra-ocular muscles intact  Pulm: Respirations unlabored and regular. Skin: warm, well perfused  Psych:   Patient has good fund of knowledge and displays understanding of exam, diagnosis, and plan.   MSK: Right lower extremity: All incisions appear to be healing well. No erythema discharge or drainage. No significant fluctuance. She is minimal tenderness palpation over the distal tibia fracture site. She is tender to palpation over the lateral aspect of the fibular plate. She has a 2 cm x 2 cm wound to the dorsal of her foot between the first and second metatarsals. Eschar noted in the base of the wound. 2+ DP pulse. TA/GS motor intact. Normal movement of the great toe in flexion extension but appears to be due to significant stiffness. . Dysestheisas in the SPN and DPN distributions. Saphenous/Sural/Plantar SILT. Radiology:  History: 30-year-old female status post right distal tibia intramedullary nailing, right distal fibula open reduction internal fixation    Comparison: 10/6/2021    Findings: 2 views of the right tibia  in a skeletally mature patient showing orthopedic hardware in appropriate positioning. No evidence of hardware loosening or failure. No new fracture angulation or deformity. Minimal callus formation noted at this time. Impression: Status post right tibia intramedullary nailing and right fibula open reduction internal fixation with no acute hardware complications. Assessment:   22y.o. year old female with status post right tibia intramedullary nailing, right distal fibula open reduction internal fixation and right great toe proximal phalanx fracture. Plan:     -Continue weightbearing as tolerated to the right lower extremity.  -Begin work on range of motion and stretching of the toes as much as possible. Given an order for physical therapy to help with this process.  -Wound care referral was ordered for the dorsum wound on the right foot. We will begin wet-to-dry dressing changes today.  -All questions were addressed patient was in agreement with this plan.   -Follow-up 6-week       Orders Placed This Encounter   Procedures    XR TIBIA FIBULA RIGHT (2 VIEWS)     Standing Status: Future     Number of Occurrences:   1     Standing Expiration Date:   12/7/2022       Electronically signed by Davis Slater DO on 12/7/2021 at 10:09 AM    This note is created with the assistance of a speech recognition program.  While intending to generate a document that actually reflects the content of the visit, the document can still have some errors including those of syntax and sound a like substitutions which may escape proof reading.   In such instances, actual meaning can be extrapolated by contextual diversion

## 2022-01-19 ENCOUNTER — TELEPHONE (OUTPATIENT)
Dept: WOUND CARE | Age: 22
End: 2022-01-19

## 2022-01-19 NOTE — TELEPHONE ENCOUNTER
Referral for right foot received, called patient at 11 am on 1-18-22 and left message regarding appointment for 1-20-22 and to call us back to confirm.   No message received and called patient again and left message to again call to confirm appointment or we will cancel if care received elsewhere

## 2022-01-19 NOTE — TELEPHONE ENCOUNTER
Patient returned call cancelling appointment for tomorrow d/t illness.   Would like to reschedule to next week

## 2022-02-01 ENCOUNTER — OFFICE VISIT (OUTPATIENT)
Dept: ORTHOPEDIC SURGERY | Age: 22
End: 2022-02-01
Payer: COMMERCIAL

## 2022-02-01 ENCOUNTER — TELEPHONE (OUTPATIENT)
Dept: ORTHOPEDIC SURGERY | Age: 22
End: 2022-02-01

## 2022-02-01 DIAGNOSIS — S82.391D OTHER CLOSED FRACTURE OF DISTAL END OF RIGHT TIBIA WITH ROUTINE HEALING, SUBSEQUENT ENCOUNTER: Primary | ICD-10-CM

## 2022-02-01 PROCEDURE — 99213 OFFICE O/P EST LOW 20 MIN: CPT | Performed by: STUDENT IN AN ORGANIZED HEALTH CARE EDUCATION/TRAINING PROGRAM

## 2022-02-01 NOTE — PROGRESS NOTES
tibia fracture site and fibula fractures. Previous wound over the dorsum of her foot has epithelialized, although the skin is erythematous and thin-appearing. No active drainage today. 2+ DP pulse. TA/GS motor intact. Good 1st MTP flexion, although extension is limited to approximately 20 deg. About 20-30 deg lag compared to contralateral extremity. Dysestheisas in the SPN and DPN distributions. Saphenous/Sural/Plantar SILT. Radiology:  History: 42-year-old female status post right distal tibia intramedullary nailing, right distal fibula open reduction internal fixation    Comparison: 12/7/2021     Findings: 2 views of the right tibia in a skeletally mature patient showing orthopedic hardware in appropriate positioning. No evidence of hardware loosening or failure. No new fracture angulation or deformity. Minimal callus formation noted at this time. Impression: Status post right tibia intramedullary nailing and right fibula open reduction internal fixation with no acute hardware complications. Right Foot XR  History: 1st prox phalanx fx, treated nonop    Comparison: 12/7/2021     Findings: 3 views of the right foot in a skeletally mature patient showing appropriate healing and acceptable alignment of her 1st proximal phalanx. Avulsion fractures about medial ligament insertions of first MTP joint. No new fracture angulation or deformity. Good interval callus formation, previous fracture lines obscured by new bone formation. Impression: right st proximal phalanx healing well without significant complications     Assessment:   22 y.o. female with status post right tibia intramedullary nailing, right distal fibula open reduction internal fixation and right great toe proximal phalanx fracture.   Plan:   -Continue weightbearing as tolerated to the right lower extremity.  -New prescription for PT provided emphasizing 1st MTP ROM  -All questions were addressed patient was in agreement with this plan.  -Follow-up 2 months     Orders Placed This Encounter   Procedures    XR FOOT RIGHT (MIN 3 VIEWS)     Standing Status:   Future     Number of Occurrences:   1     Standing Expiration Date:   2/1/2023    XR TIBIA FIBULA RIGHT (2 VIEWS)     Standing Status:   Future     Number of Occurrences:   1     Standing Expiration Date:   2/1/2023       Electronically signed by Iza Coughlin MD on 2/1/2022 at 3:40 PM    This note is created with the assistance of a speech recognition program.  While intending to generate a document that actually reflects the content of the visit, the document can still have some errors including those of syntax and sound a like substitutions which may escape proof reading.   In such instances, actual meaning can be extrapolated by contextual diversion

## 2022-02-01 NOTE — TELEPHONE ENCOUNTER
Did not receive these. Contacted the therapy department to have them refax the forms      Also they asked for a C-9 for extension for PT. Will sent that to [phone] [facility]

## 2022-02-01 NOTE — TELEPHONE ENCOUNTER
rio from [facility] rehab called to check on the status of pts plan of care letter that were faxed to our office on 12/15/21 and 1/10/22 please fax to [phone]

## 2022-02-15 ENCOUNTER — HOSPITAL ENCOUNTER (OUTPATIENT)
Dept: WOUND CARE | Age: 22
Discharge: HOME | End: 2022-02-15
Payer: COMMERCIAL

## 2022-02-15 VITALS
RESPIRATION RATE: 16 BRPM | TEMPERATURE: 98.8 F | DIASTOLIC BLOOD PRESSURE: 58 MMHG | HEART RATE: 52 BPM | SYSTOLIC BLOOD PRESSURE: 98 MMHG

## 2022-02-15 DIAGNOSIS — S82.391D TRAUMATIC CLOSED NONDISPLACED FRACTURE OF POSTERIOR MALLEOLUS, RIGHT, WITH ROUTINE HEALING, SUBSEQUENT ENCOUNTER: ICD-10-CM

## 2022-02-15 PROCEDURE — 99203 OFFICE O/P NEW LOW 30 MIN: CPT | Performed by: PODIATRIST

## 2022-02-15 PROCEDURE — 99203 OFFICE O/P NEW LOW 30 MIN: CPT
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
## 2022-02-22 ENCOUNTER — TELEPHONE (OUTPATIENT)
Dept: ORTHOPEDIC SURGERY | Age: 22
End: 2022-02-22

## 2022-02-28 ENCOUNTER — TELEPHONE (OUTPATIENT)
Dept: ORTHOPEDIC SURGERY | Age: 22
End: 2022-02-28

## 2022-03-04 ENCOUNTER — TELEPHONE (OUTPATIENT)
Dept: ORTHOPEDIC SURGERY | Age: 22
End: 2022-03-04

## 2022-03-09 ENCOUNTER — TELEPHONE (OUTPATIENT)
Dept: ORTHOPEDIC SURGERY | Age: 22
End: 2022-03-09

## 2022-03-09 NOTE — TELEPHONE ENCOUNTER
Freda Hull is has not received the pended C-9 sent on 2/22/2022 as well as 3/4/2022.      Re-Sent fax to [phone]

## 2022-03-22 ENCOUNTER — TELEPHONE (OUTPATIENT)
Dept: ORTHOPEDIC SURGERY | Age: 22
End: 2022-03-22

## 2022-04-05 ENCOUNTER — OFFICE VISIT (OUTPATIENT)
Dept: ORTHOPEDIC SURGERY | Age: 22
End: 2022-04-05
Payer: COMMERCIAL

## 2022-04-05 VITALS — HEIGHT: 62 IN | BODY MASS INDEX: 17.48 KG/M2 | WEIGHT: 95 LBS

## 2022-04-05 DIAGNOSIS — S82.391D TRAUMATIC CLOSED NONDISPLACED FRACTURE OF POSTERIOR MALLEOLUS, RIGHT, WITH ROUTINE HEALING, SUBSEQUENT ENCOUNTER: Primary | ICD-10-CM

## 2022-04-05 DIAGNOSIS — S82.391D OTHER CLOSED FRACTURE OF DISTAL END OF RIGHT TIBIA WITH ROUTINE HEALING, SUBSEQUENT ENCOUNTER: ICD-10-CM

## 2022-04-05 PROCEDURE — 99213 OFFICE O/P EST LOW 20 MIN: CPT | Performed by: ORTHOPAEDIC SURGERY

## 2022-04-05 NOTE — LETTER
[facility]  [address] [facility]  Phone: [phone]  Fax: [phone] [facility], DO        April 5, 2022    [address] [place] [address]      Dear Otilia Santos: Work restrictions:    Patient is to work up to 6 hours per day    No tip toeing of any kind while at work    Patient should have a seating break every 30 minutes. It would be more ideal if patient could have a seated position for her job if possible. If you have any questions or concerns, please don't hesitate to call.     Sincerely,        Ebony Lopez DO

## 2022-04-05 NOTE — PROGRESS NOTES
[address] [facility]  [address] [place] [address]  Dept: [phone]  Dept Fax: [phone]        Orthopaedic Trauma Clinic Follow Up    Subjective:   Date of Surgery: 10/16/2021    Franki Aguero is a 25y.o. year old female who presents to the clinic today for routine followup regarding her intramedullary nail fixation of right distal tibia fracture and nonoperative care of her ipsilateral first MTP fracture following a crush injury at work on the date above therefore placing patient approximately 6 months postop. Patient reports to be doing overall well. Her last visit was on 2/1/2022 where she was noted to have some MTP joints stiffness and pain at the time with inability to extend her first MTP joint thus was prescribed physical therapy. Patient ports that she began physical therapy approximately 1 week ago when she was approved from [facility]. Patient reports that her MTP joint function has improved but also still has some pain with tiptoeing and also point tenderness at the lateral aspect of the first MTP joint. Patient still describes some mild tingling to the superficial peroneal nerve distribution at the right ankle. Otherwise, patient has no orthopedic complaints at this time. Review of Systems  Gen: no fever, chills, malaise  CV: no chest pain or palpitations  Resp: no cough or shortness of breath  GI: no nausea, vomiting, diarrhea, or constipation  Neuro: no numbness, tingling, or weakness  Msk: As described in HPI  10 remaining systems reviewed and negative    Objective : There were no vitals filed for this visit. Body mass index is 17.38 kg/m². General: No acute distress, resting comfortably in the clinic  Neuro: alert. oriented  Eyes: Extra-ocular muscles intact  Pulm: Respirations unlabored and regular.   Skin: warm, well perfused  Psych:   Patient has good fund of knowledge and displays understanging of exam, diagnosis, and plan. MSK: Right lower extremity:  Surgical incisions appear well-healed scar. Wound site along the dorsal aspect of the first met webspace of the foot appears well-healed now. Tenderness palpation along the metatarsal head and proximal phalanx at the lateral aspect of the first MTP joint. Patient is able to extend her first MTP joint. Some clinical hallux valgus deformity noted. Patient is able to tiptoe although painful. Patient walks with a minimally antalgic gait. Neurovascularly intact distally. Radiology:  None taken in clinic     Assessment:   25y.o. year old female with right intramedullary nail fixation of distal tibia and first MTP joint fracture  Plan:      Patient is here today for routine follow-up of the aforementioned surgery and injury. Regarding her first MTP joint, appears that patient has regained some more motion and has strengthening. Patient still has issue tiptoeing and standing for long periods of time. Patient is due to return to work on 4/11/2022. Work restrictions were placed including no standing greater than 30 minutes with preference to a seated position for work and no tiptoeing while working. Patient is also to not work more than 6 hours a day. These restrictions were placed so she follows up with us in 2 months. Encourage continue physical therapy for strengthening of her first MTP joint. If in the next follow-up she is continues to not do well, we will recommend referral to Dr. Chantel Carr for further evaluation. She was amenable to all recommendations and was correct to call the office with any questions. Thank    Follow up:Return in about 2 months (around 6/5/2022). No orders of the defined types were placed in this encounter. No orders of the defined types were placed in this encounter.       Electronically signed by Lucero Baltazar DO on 4/5/2022 at 12:09 PM

## 2022-04-06 ENCOUNTER — PATIENT MESSAGE (OUTPATIENT)
Dept: ORTHOPEDIC SURGERY | Age: 22
End: 2022-04-06

## 2022-04-06 NOTE — TELEPHONE ENCOUNTER
Please see patient request.   These were the restrictions placed per OVN:   Patient is due to return to work on 4/11/2022. Work restrictions were placed including no standing greater than 30 minutes with preference to a seated position for work and no tiptoeing while working. Patient is also to not work more than 6 hours a day. Do you want to maintain the work restrictions from last visit, or change them?

## 2022-04-06 NOTE — TELEPHONE ENCOUNTER
From: Rao Saucedo  To: Dr. Bere Gironoty: 4/6/2022 4:00 PM EDT  Subject: Carlton Land, during my physical therapy visit today I talked with my therapist and we agreed it would be better for me to finish my physical therapy and stay out of work until after April 27th. This would give my time to finish up my last 9 visits. My work has asked for a [employer] form for that extension period if that could be faxed over.  Please call with any questions, thank you.  [phone]

## 2022-04-07 NOTE — TELEPHONE ENCOUNTER
Patient already has very limited work restrictions so we will keep these and continue for her to go back to work on 4/12/2022. She can continue her therapy while she goes to work.

## 2022-04-11 ENCOUNTER — PATIENT MESSAGE (OUTPATIENT)
Dept: ORTHOPEDIC SURGERY | Age: 22
End: 2022-04-11

## 2022-04-11 NOTE — TELEPHONE ENCOUNTER
From: Ritu Saucedo  To: Dr. Tom Bernstein: 4/11/2022 1:45 PM EDT  Subject: Shayy Damon, I need my [employer] form and restrictions faxed to my work at [employer]. They have said they still have not received it so I am just following up on that. The fax number is [phone]. It can also be emailed to my HR rep. [email or web address]

## 2022-05-05 ENCOUNTER — TELEPHONE (OUTPATIENT)
Dept: ORTHOPEDIC SURGERY | Age: 22
End: 2022-05-05

## 2022-05-05 NOTE — TELEPHONE ENCOUNTER
New C9 needed for continued therapy for 2x week starting on 5/11/22 for 10 visits.   Please fax to [phone], contact is Reese Crabtree at [phone]

## 2022-05-25 ENCOUNTER — TELEPHONE (OUTPATIENT)
Dept: ORTHOPEDIC SURGERY | Age: 22
End: 2022-05-25

## 2022-05-25 NOTE — TELEPHONE ENCOUNTER
Aurora with [facility] contacted the office in regards to the C9 requests for additional PT send on 5/6/22 and again last week. She also asked for recent plan of care to be faxed to [phone]. The plan of care was faxed- please follow up with [facility] as to the delay of status for the C9 request and ring Feng back at [phone]. Patient still awaiting for more physical therapy.

## 2022-05-25 NOTE — TELEPHONE ENCOUNTER
C-9 previously faxed. Re faxed today to provided number. Josefa Rosas. No answer, [facility] left stating C-9 was re faxed no PoC in faxes, asked to refax.

## 2022-05-26 NOTE — TELEPHONE ENCOUNTER
Called , Alexander Farrar. Alexander Farrar is currently on vacation. Spoke with Fernando Snowden., , who gave me the number for [employer]'s manager. Akin Luu did not answer. I called twice. I left one message stating we have sent a C-9 request for continued physical therapy. I stated this was sent on multiple occasions with no response. Left my name and office number for return call.

## 2022-05-26 NOTE — TELEPHONE ENCOUNTER
C9 has already been faxed several times, need to reach out to Pipe Griffith, patients [facility] rep to see what the hold up is. Please follow up on this today.

## 2022-06-01 NOTE — TELEPHONE ENCOUNTER
Called [facility]. Spoke with Dino Duval. Dino Duval gave me the number to complaint department for [facility] self insured team. I was transferred. No answer. Left [facility] for Agnes Jorgensen in regards to lack of response from CHRISTOPHER/[facility], .      [phone] independent

## 2022-06-03 NOTE — TELEPHONE ENCOUNTER
: Erwin Plunkettjordan, email is [email or web address]. [employer], his direct line is [phone], his supervisor's name is Burton Melvin, her direct line is [phone]

## 2022-06-07 ENCOUNTER — OFFICE VISIT (OUTPATIENT)
Dept: ORTHOPEDIC SURGERY | Age: 22
End: 2022-06-07
Payer: COMMERCIAL

## 2022-06-07 VITALS — HEIGHT: 62 IN | WEIGHT: 95 LBS | BODY MASS INDEX: 17.48 KG/M2

## 2022-06-07 DIAGNOSIS — M20.11 HALLUX VALGUS WITH BUNIONS, RIGHT: Primary | ICD-10-CM

## 2022-06-07 DIAGNOSIS — M21.611 HALLUX VALGUS WITH BUNIONS, RIGHT: Primary | ICD-10-CM

## 2022-06-07 PROCEDURE — 99212 OFFICE O/P EST SF 10 MIN: CPT | Performed by: ORTHOPAEDIC SURGERY

## 2022-06-20 ENCOUNTER — OFFICE VISIT (OUTPATIENT)
Dept: ORTHOPEDIC SURGERY | Age: 22
End: 2022-06-20
Payer: COMMERCIAL

## 2022-06-20 VITALS — HEIGHT: 62 IN | WEIGHT: 95 LBS | OXYGEN SATURATION: 100 % | BODY MASS INDEX: 17.48 KG/M2 | RESPIRATION RATE: 16 BRPM

## 2022-06-20 DIAGNOSIS — S92.411D CLOSED DISPLACED FRACTURE OF PROXIMAL PHALANX OF RIGHT GREAT TOE WITH ROUTINE HEALING, SUBSEQUENT ENCOUNTER: ICD-10-CM

## 2022-06-20 DIAGNOSIS — M20.11 HALLUX VALGUS OF RIGHT FOOT: Primary | ICD-10-CM

## 2022-06-20 DIAGNOSIS — S82.391D TRAUMATIC CLOSED NONDISPLACED FRACTURE OF POSTERIOR MALLEOLUS, RIGHT, WITH ROUTINE HEALING, SUBSEQUENT ENCOUNTER: Primary | ICD-10-CM

## 2022-06-20 PROCEDURE — 99213 OFFICE O/P EST LOW 20 MIN: CPT | Performed by: ORTHOPAEDIC SURGERY
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
## 2022-06-21 ENCOUNTER — TELEPHONE (OUTPATIENT)
Dept: ORTHOPEDIC SURGERY | Age: 22
End: 2022-06-21

## 2022-06-21 NOTE — TELEPHONE ENCOUNTER
Spoke with patient. She was asking if her C-9 had been submitted for the MRI. I expressed that I was in clinic this morning, and have it drawn up and ready for Dr. Leni Moura to sign Thursday and then I will fax it in. She was also asking about a [employer]-14 and continuing light duty. I stated that Dr. Leni Moura, per his note, stated nothing about light duty or work restrictions. She expressed that in her opinion she should continue light duty until something is figured out for her. She says that the [employer]-14 would need to be backdated. I told her the date that would start would be her visit yesterday (6/20), and then I would have to speak to Dr. Leni Moura to see if he will extend her out for light duty. I told her that I would reach out to her sometime Thursday after clinic to let her know what he says, and the [employer]-14 will be faxed at that time. She stated understanding and had no further questions. She was also questioning the C-9 from Dr. Ilsa Donovan office that still has not been approved for the patient to get a 2nd opinion with Dr. Leni Moura. She was seen regardless because she would have had to re-schedule, and I told her she would need to contact Dr. Ilsa Donovan office to get this fixed and let them know when she was seen because his office scheduled her without the approval. Pt seemed to understand.

## 2022-06-24 ENCOUNTER — TELEPHONE (OUTPATIENT)
Dept: ORTHOPEDIC SURGERY | Age: 22
End: 2022-06-24

## 2022-06-24 NOTE — TELEPHONE ENCOUNTER
I called patient in regards to her [employer]-14. She asked for me to leave a VM so I did. I informed her that Dr. Maddi Nagy stated if we fill out her [employer]-14, he is not going to put her on any restrictions at this time. My last conversation with the patient was, was that she felt in \"her opinion\" that she needed to continue light duty. Dr. Maddi Nagy stated that if she feels that way, then she will need to reach out to Dr. Kinsey Herrera office to see if they will continue her [employer]-14 with those restrictions. Stated to her also that the C-9 for her MRI was faxed, and I am waiting for the approval which I will call her when I have so we can move forward to schedule. Otherwise, at this time, if she would like me to continue the [employer]-14 there will be no restrictions at this time.

## 2022-06-27 ENCOUNTER — PATIENT MESSAGE (OUTPATIENT)
Dept: ORTHOPEDIC SURGERY | Age: 22
End: 2022-06-27

## 2022-06-28 NOTE — TELEPHONE ENCOUNTER
From: Thierno Saucedo  To: Dr. Reis Challenger: 6/27/2022 6:05 PM EDT  Subject: Hernandez Stevens, I am reaching out to see if I am able to be cleared back to full duty for work. I would need clearance from Dr. Corbin Madrigal before Dr. Sánchez Argueta could clear me and start the process for my MRI.  Thank you

## 2022-07-05 ENCOUNTER — TELEPHONE (OUTPATIENT)
Dept: ORTHOPEDIC SURGERY | Age: 22
End: 2022-07-05

## 2022-07-15 ENCOUNTER — TELEPHONE (OUTPATIENT)
Dept: ORTHOPEDIC SURGERY | Age: 22
End: 2022-07-15

## 2022-07-15 NOTE — TELEPHONE ENCOUNTER
I left  for patient to call me back. Wanted to let her know I called [facility] again, and got in touch with a lady named Cher Lee who was on her [employer] on the website. She was able to let me know that the patients employer is responsible to let me know if they approve the MRI or not. She verified that I had the correct fax of [phone]. She states that in her chart, she obtained an  back in February, which could also be a reason why no one is calling me back as well, and things may need to go through them first or they may need to contact [facility] to let them know we are trying to get this approved for her. Told her that is has been since 6/23 that I have been trying to get this approved, and then re-faxed again on 7/11 with multiple calls in between being made to try and get this approved. At this time there has still been no response.

## 2022-07-21 NOTE — PROGRESS NOTES
[facility]  [id number] [facility]  Dept Phone: [phone]  Dept Fax: [phone]      Orthopaedic Trauma Clinic Follow Up      Subjective:   Date of Surgery: 10/16/2021    Brady Gomez is a 25y.o. year old female who presents to the clinic today for routine follow up Intramedullary nail of right distal tibia and nonoperative care of ipsilateral first MTP fracture following crush injury at work. Overall doing well but still having the pain in the MTP joint especially when on tiptoes. No pain in the tibia or complications with wounds. No new trauma. Review of Systems  Gen: no fever, chills, malaise  CV: no chest pain or palpitations  Resp: no cough or shortness of breath  GI: no nausea, vomiting, diarrhea, or constipation  Neuro: no seizures, vertigo, or headache  Msk: Pain to right toe  10 remaining systems reviewed and negative    Objective : There were no vitals filed for this visit. Body mass index is 17.38 kg/m². General: No acute distress, resting comfortably in the clinic  Neuro: alert. oriented  Eyes: Extra-ocular muscles intact  Pulm: Respirations unlabored and regular. Skin: warm, well perfused  Psych:   Patient has good fund of knowledge and displays understanding of exam, diagnosis, and plan. MSK:  RLE: Pain with palpation about the first MTP joint. compartments soft. 2+ DP/PT pulses with BCR. TA/EHL/FHL/GS motor intact. Saph/Valeria/DP/SP nerves SILT    Radiology:  None     Assessment:   25y.o. year old female with Chronic pain right first MTP joint, united right tibial shaft fracture status post IMN  Plan:   Discussed again referral to foot and ankle specialist Dr. Zak Champagne. She is now agreeable. Follow up:No follow-ups on file. No orders of the defined types were placed in this encounter.          Orders Placed This Encounter   Procedures    Rodrigo Henson MD, Orthopedic Surgery (foot & ankle), [place]     Referral

## (undated) DEVICE — C-ARMOR C-ARM EQUIPMENT COVERS CLEAR STERILE UNIVERSAL FIT 12 PER CASE: Brand: C-ARMOR

## (undated) DEVICE — SUTURE VCRL SZ 0 L18IN ABSRB UD L36MM CT-1 1/2 CIR J840D

## (undated) DEVICE — SPINAL INSTRUMENT RONGEUR (STRAIGHT) 8MM: Type: IMPLANTABLE DEVICE | Site: TIBIA | Status: NON-FUNCTIONAL

## (undated) DEVICE — Device

## (undated) DEVICE — C-ARM: Brand: UNBRANDED

## (undated) DEVICE — SUTURE VCRL SZ 2-0 L27IN ABSRB UD L22MM X-1 1/2 CIR REV CUT J459H

## (undated) DEVICE — NEPTUNE E-SEP SMOKE EVACUATION PENCIL, COATED, 70MM BLADE, PUSH BUTTON SWITCH: Brand: NEPTUNE E-SEP

## (undated) DEVICE — ROD RM L1150MM DIA2.5MM TI W/ EXTN BALL TIP FOR IM NAIL

## (undated) DEVICE — PADDING,UNDERCAST,COTTON, 4"X4YD STERILE: Brand: MEDLINE

## (undated) DEVICE — DRAPE,U/ SHT,SPLIT,PLAS,STERIL: Brand: MEDLINE

## (undated) DEVICE — ZIMMER® STERILE DISPOSABLE TOURNIQUET CUFF WITH PROTECTIVE SLEEVE AND PLC, DUAL PORT, SINGLE BLADDER, 24 IN. (61 CM)

## (undated) DEVICE — GOWN,SIRUS,NONRNF,SETINSLV,2XL,18/CS: Brand: MEDLINE

## (undated) DEVICE — DRESSING,GAUZE,XEROFORM,CURAD,1"X8",ST: Brand: CURAD

## (undated) DEVICE — 1016 S-DRAPE IRRIG POUCH 10/BOX: Brand: STERI-DRAPE™

## (undated) DEVICE — CYSTO/BLADDER IRRIGATION SET, REGULATING CLAMP

## (undated) DEVICE — BIT DRL L140MM DIA2MM QUIK CPL 3 FLUT CALIB DEPTH MRK W/O

## (undated) DEVICE — BANDAGE COBAN 6 IN WND 6INX5YD FOAM

## (undated) DEVICE — BLADE CLIPPER GEN PURP NS

## (undated) DEVICE — BNDG,ELSTC,MATRIX,STRL,6"X5YD,LF,HOOK&LP: Brand: MEDLINE

## (undated) DEVICE — GLOVE ORTHO 8   MSG9480

## (undated) DEVICE — TUBING AMB

## (undated) DEVICE — BIT DRL L145MM DIA4.2MM NONSTERILE 3 FLUT NDL PNT QUIK CPL

## (undated) DEVICE — SUTURE VCRL SZ 2-0 L18IN ABSRB UD CT-1 L36MM 1/2 CIR J839D

## (undated) DEVICE — PADDING CAST W6INXL4YD COT LO LINTING WYTEX

## (undated) DEVICE — STOCKINETTE,IMPERVIOUS,12X48,STERILE: Brand: MEDLINE

## (undated) DEVICE — SVMMC ORTH SPL DRP PK

## (undated) DEVICE — BANDAGE,ELASTIC,ESMARK,STERILE,4"X9',LF: Brand: MEDLINE

## (undated) DEVICE — 1010 S-DRAPE TOWEL DRAPE 10/BX: Brand: STERI-DRAPE™

## (undated) DEVICE — GUIDEWIRE ORTH L400MM DIA3.2MM FOR TFN

## (undated) DEVICE — Z DISCONTINUED USE 2272124 DRAPE SURG XL N INVASIVE 2 LAYR DISP

## (undated) DEVICE — SUTURE VCRL SZ 0 L27IN ABSRB UD L36MM CT-1 1/2 CIR J260H

## (undated) DEVICE — SUTURE NONABSORBABLE MONOFILAMENT 3-0 PS-1 18 IN BLK ETHILON 1663H

## (undated) DEVICE — GAUZE,SPONGE,FLUFF,6"X6.75",STRL,5/TRAY: Brand: MEDLINE